# Patient Record
Sex: FEMALE | Race: WHITE | NOT HISPANIC OR LATINO | Employment: PART TIME | URBAN - METROPOLITAN AREA
[De-identification: names, ages, dates, MRNs, and addresses within clinical notes are randomized per-mention and may not be internally consistent; named-entity substitution may affect disease eponyms.]

---

## 2017-04-18 ENCOUNTER — OFFICE VISIT (OUTPATIENT)
Dept: FAMILY MEDICINE | Facility: CLINIC | Age: 57
End: 2017-04-18
Attending: FAMILY MEDICINE
Payer: COMMERCIAL

## 2017-04-18 VITALS
TEMPERATURE: 98 F | HEART RATE: 85 BPM | OXYGEN SATURATION: 96 % | HEIGHT: 65 IN | SYSTOLIC BLOOD PRESSURE: 120 MMHG | WEIGHT: 117.88 LBS | DIASTOLIC BLOOD PRESSURE: 74 MMHG | BODY MASS INDEX: 19.64 KG/M2

## 2017-04-18 DIAGNOSIS — R63.4 WEIGHT LOSS: ICD-10-CM

## 2017-04-18 DIAGNOSIS — J06.9 UPPER RESPIRATORY TRACT INFECTION, UNSPECIFIED TYPE: Primary | ICD-10-CM

## 2017-04-18 DIAGNOSIS — F17.200 SMOKER: ICD-10-CM

## 2017-04-18 PROCEDURE — 99999 PR PBB SHADOW E&M-EST. PATIENT-LVL III: CPT | Mod: PBBFAC,,, | Performed by: FAMILY MEDICINE

## 2017-04-18 PROCEDURE — 1160F RVW MEDS BY RX/DR IN RCRD: CPT | Mod: S$GLB,,, | Performed by: FAMILY MEDICINE

## 2017-04-18 PROCEDURE — 99213 OFFICE O/P EST LOW 20 MIN: CPT | Mod: S$GLB,,, | Performed by: FAMILY MEDICINE

## 2017-04-18 RX ORDER — GUAIFENESIN, PSEUDOEPHEDRINE HYDROCHLORIDE 600; 60 MG/1; MG/1
1 TABLET, EXTENDED RELEASE ORAL 2 TIMES DAILY
Qty: 30 TABLET | Refills: 1 | Status: SHIPPED | OUTPATIENT
Start: 2017-04-18 | End: 2017-04-28

## 2017-04-18 RX ORDER — CETIRIZINE HYDROCHLORIDE 10 MG/1
10 TABLET ORAL DAILY
Qty: 30 TABLET | Refills: 2 | Status: SHIPPED | OUTPATIENT
Start: 2017-04-18 | End: 2022-12-13

## 2017-04-18 NOTE — PROGRESS NOTES
"  Subjective:       Megan Mccall is a 56 y.o. female who presents for evaluation of symptoms of a URI. Symptoms include congestion, headache described as slight, no  fever, post nasal drip, sinus pressure, sneezing and tooth pain. Onset of symptoms was 4 days ago, and has been gradually improving since that time. Treatment to date: none.    The following portions of the patient's history were reviewed and updated as appropriate: allergies, current medications, past family history, past medical history, past social history, past surgical history and problem list.    Review of Systems  A comprehensive review of systems was negative except for: Constitutional: positive for weight loss (~ 15 lbs over past year)    Objective:      /74 (BP Location: Right arm, Patient Position: Sitting, BP Method: Manual)  Pulse 85  Temp 98.1 °F (36.7 °C) (Oral)   Ht 5' 4.5" (1.638 m)  Wt 53.5 kg (117 lb 14.4 oz)  SpO2 96%  BMI 19.92 kg/m2  General appearance: alert, appears stated age and cooperative  Head: Normocephalic, without obvious abnormality, atraumatic  Ears: normal TM's and external ear canals both ears  Nose: turbinates pink, swollen, edematous  Throat: abnormal findings: mild oropharyngeal erythema  Neck: no adenopathy, no carotid bruit, no JVD, supple, symmetrical, trachea midline and thyroid not enlarged, symmetric, no tenderness/mass/nodules  Lungs: clear to auscultation bilaterally  Heart: regular rate and rhythm, S1, S2 normal, no murmur, click, rub or gallop     Assessment:      viral upper respiratory illness    weight loss    Plan:      Discussed diagnosis and treatment of URI.  Discussed the importance of avoiding unnecessary antibiotic therapy.  Suggested symptomatic OTC remedies.  Suggested brief course of Afrin or similar.    CXR.  Follow-up by phone/email in a few days.  "

## 2017-04-18 NOTE — MR AVS SNAPSHOT
[facility]  [address] [place] [address], [address]  [facility] [address]  Phone: [phone]                  Megan Mccall   2017 2:00 PM   Office Visit    Description:  Female : 1960   Provider:  Edwar Rodarte Jr., MD   Department:  [facility]           Reason for Visit     Sore Throat           Diagnoses this Visit        Comments    Upper respiratory tract infection, unspecified type    -  Primary     Weight loss         Smoker                To Do List           Goals (5 Years of Data)     None       These Medications        Disp Refills Start End    pseudoephedrine-guaifenesin  mg (MUCINEX D)  mg per tablet 30 tablet 1 2017    Take 1 tablet by mouth 2 (two) times daily. - Oral    Pharmacy: [facility] - [place], [place] - [address]  #: [phone]       cetirizine (ZYRTEC) 10 MG tablet 30 tablet 2 2017     Take 1 tablet (10 mg total) by mouth once daily. - Oral    Pharmacy: [facility] - [place], [place] - [address]  #: [phone]         [facility]     [facility] Care Line -  Assistance  Unless otherwise directed by your provider, please contact [facility], our nurse care line that is available for  assistance.     Registered nurses in the [facility] provide: appointment scheduling, clinical advisement, health education, and other advisory services.  Call: [phone] (toll free)               Medications           Message regarding Medications     Verify the changes and/or additions to your medication regime listed below are the same as discussed with your clinician today.  If any of these changes or additions are incorrect, please notify your healthcare provider.        START taking these NEW medications        Refills    pseudoephedrine-guaifenesin  mg (MUCINEX D)  mg per tablet 1    Sig: Take 1 tablet by mouth 2 (two) times daily.    Class:  "Normal    Route: Oral    cetirizine (ZYRTEC) 10 MG tablet 2    Sig: Take 1 tablet (10 mg total) by mouth once daily.    Class: Normal    Route: Oral      STOP taking these medications     escitalopram oxalate (LEXAPRO) 20 MG tablet Take 20 mg by mouth once daily.    peg-electrolyte soln (TRILYTE WITH FLAVOR PACKETS) 420 gram SolR Take 4,000 mLs by mouth as directed.           Verify that the below list of medications is an accurate representation of the medications you are currently taking.  If none reported, the list may be blank. If incorrect, please contact your healthcare provider. Carry this list with you in case of emergency.           Current Medications     cetirizine (ZYRTEC) 10 MG tablet Take 1 tablet (10 mg total) by mouth once daily.    pseudoephedrine-guaifenesin  mg (MUCINEX D)  mg per tablet Take 1 tablet by mouth 2 (two) times daily.           Clinical Reference Information           Your Vitals Were     BP Pulse Temp Height    120/74 (BP Location: Right arm, Patient Position: Sitting, BP Method: Manual) 85 98.1 °F (36.7 °C) (Oral) 5' 4.5" (1.638 m)    Weight SpO2 BMI    53.5 kg (117 lb 14.4 oz) 96% 19.92 kg/m2      Blood Pressure          Most Recent Value    BP  120/74      Allergies as of 4/18/2017     No Known Allergies      Immunizations Administered on Date of Encounter - 4/18/2017     None      Orders Placed During Today's Visit     Future Labs/Procedures Expected by Expires    X-Ray Chest PA And Lateral  4/18/2017 4/18/2018      Instructions        Call if:  * A high, prolonged fever (above 102 degrees)  * Symptoms that last for more than 10 days or get worse instead of better  * Trouble breathing or shortness of breath  * Pain or pressure in the chest  * Fainting or feeling like you are about to faint  * Confusion or disorientation  * Severe or persistent vomiting  * Severe pain in your face or forehead  * Hoarseness, sore throat or a cough that won't go away after 10 " days      For sore throat:  Gargle with warm salt water several times a day to reduce throat swelling and pain. Mix 1 teaspoon of salt in 8 ounces of water.   Drink extra fluids to soothe your throat. Honey or lemon in weak tea may help.   Get plenty of rest.   Takeover-the-counter medicine such as aspirin or ibuprofen to relieve pain and reduce fever.   Try throat lozenges that have a painkiller to numb your throat, such as Sucrets Maximum Strength. Regular cough drops may also help.   Try a decongestant. It can make breathing easier and relieve a runny nose and postnasal drip, which can make your throat hurt.            Smoking Cessation     If you would like to quit smoking:   You may be eligible for free services if you are a [place] resident and started smoking cigarettes before September 1, 1988.  Call the Smoking Cessation [employer] ([facility]) toll free at (173) 729-4488 or ([phone] 492-3958.   Call 1-800-QUIT-NOW if you do not meet the above criteria.   Contact us via email: [email or web address]   View our website for more information: [email or web address]        Language Assistance Services     ATTENTION: Language assistance services are available, free of charge. Please call [phone].      ATENCIÓN: Si olamide cristobal, tiene a aly disposición servicios gratuitos de asistencia lingüística. Llame al [phone].     CHÚ Ý: N?u b?n nói Ti?ng Vi?t, có các d?ch v? h? tr? ngôn ng? mi?n phí dành cho b?n. G?i s? [phone].         [facility] complies with applicable Federal civil rights laws and does not discriminate on the basis of race, color, national origin, age, disability, or sex.

## 2018-05-18 DIAGNOSIS — Z11.59 NEED FOR HEPATITIS C SCREENING TEST: ICD-10-CM

## 2018-06-25 DIAGNOSIS — Z12.39 BREAST CANCER SCREENING: ICD-10-CM

## 2018-06-29 ENCOUNTER — PATIENT MESSAGE (OUTPATIENT)
Dept: ENDOSCOPY | Facility: HOSPITAL | Age: 58
End: 2018-06-29

## 2018-10-30 NOTE — PATIENT INSTRUCTIONS
Call if:  * A high, prolonged fever (above 102 degrees)  * Symptoms that last for more than 10 days or get worse instead of better  * Trouble breathing or shortness of breath  * Pain or pressure in the chest  * Fainting or feeling like you are about to faint  * Confusion or disorientation  * Severe or persistent vomiting  * Severe pain in your face or forehead  * Hoarseness, sore throat or a cough that won't go away after 10 days      For sore throat:  Gargle with warm salt water several times a day to reduce throat swelling and pain. Mix 1 teaspoon of salt in 8 ounces of water.   Drink extra fluids to soothe your throat. Honey or lemon in weak tea may help.   Get plenty of rest.   Takeover-the-counter medicine such as aspirin or ibuprofen to relieve pain and reduce fever.   Try throat lozenges that have a painkiller to numb your throat, such as Sucrets Maximum Strength. Regular cough drops may also help.   Try a decongestant. It can make breathing easier and relieve a runny nose and postnasal drip, which can make your throat hurt.       
Benefits, risks, and possible complications of procedure explained to patient/caregiver who verbalized understanding and gave verbal consent.

## 2022-10-20 ENCOUNTER — OFFICE VISIT (OUTPATIENT)
Dept: URGENT CARE | Facility: CLINIC | Age: 62
End: 2022-10-20
Payer: COMMERCIAL

## 2022-10-20 VITALS
OXYGEN SATURATION: 97 % | SYSTOLIC BLOOD PRESSURE: 127 MMHG | HEIGHT: 64 IN | BODY MASS INDEX: 18.27 KG/M2 | TEMPERATURE: 98 F | WEIGHT: 107 LBS | RESPIRATION RATE: 18 BRPM | HEART RATE: 92 BPM | DIASTOLIC BLOOD PRESSURE: 88 MMHG

## 2022-10-20 DIAGNOSIS — F17.200 NEEDS SMOKING CESSATION EDUCATION: ICD-10-CM

## 2022-10-20 DIAGNOSIS — N30.01 ACUTE CYSTITIS WITH HEMATURIA: Primary | ICD-10-CM

## 2022-10-20 LAB
BILIRUB UR QL STRIP: NEGATIVE
GLUCOSE UR QL STRIP: NEGATIVE
KETONES UR QL STRIP: NEGATIVE
LEUKOCYTE ESTERASE UR QL STRIP: POSITIVE
PH, POC UA: 6.5 (ref 5–8)
POC BLOOD, URINE: POSITIVE
POC NITRATES, URINE: NEGATIVE
PROT UR QL STRIP: POSITIVE
SP GR UR STRIP: 1.01 (ref 1–1.03)
UROBILINOGEN UR STRIP-ACNC: NORMAL (ref 0.1–1.1)

## 2022-10-20 PROCEDURE — 1160F PR REVIEW ALL MEDS BY PRESCRIBER/CLIN PHARMACIST DOCUMENTED: ICD-10-PCS | Mod: CPTII,S$GLB,, | Performed by: PHYSICIAN ASSISTANT

## 2022-10-20 PROCEDURE — 99203 PR OFFICE/OUTPT VISIT, NEW, LEVL III, 30-44 MIN: ICD-10-PCS | Mod: S$GLB,,, | Performed by: PHYSICIAN ASSISTANT

## 2022-10-20 PROCEDURE — 1159F PR MEDICATION LIST DOCUMENTED IN MEDICAL RECORD: ICD-10-PCS | Mod: CPTII,S$GLB,, | Performed by: PHYSICIAN ASSISTANT

## 2022-10-20 PROCEDURE — 3074F SYST BP LT 130 MM HG: CPT | Mod: CPTII,S$GLB,, | Performed by: PHYSICIAN ASSISTANT

## 2022-10-20 PROCEDURE — 81003 POCT URINALYSIS, DIPSTICK, AUTOMATED, W/O SCOPE: ICD-10-PCS | Mod: QW,S$GLB,, | Performed by: PHYSICIAN ASSISTANT

## 2022-10-20 PROCEDURE — 81003 URINALYSIS AUTO W/O SCOPE: CPT | Mod: QW,S$GLB,, | Performed by: PHYSICIAN ASSISTANT

## 2022-10-20 PROCEDURE — 1160F RVW MEDS BY RX/DR IN RCRD: CPT | Mod: CPTII,S$GLB,, | Performed by: PHYSICIAN ASSISTANT

## 2022-10-20 PROCEDURE — 1159F MED LIST DOCD IN RCRD: CPT | Mod: CPTII,S$GLB,, | Performed by: PHYSICIAN ASSISTANT

## 2022-10-20 PROCEDURE — 3074F PR MOST RECENT SYSTOLIC BLOOD PRESSURE < 130 MM HG: ICD-10-PCS | Mod: CPTII,S$GLB,, | Performed by: PHYSICIAN ASSISTANT

## 2022-10-20 PROCEDURE — 3079F DIAST BP 80-89 MM HG: CPT | Mod: CPTII,S$GLB,, | Performed by: PHYSICIAN ASSISTANT

## 2022-10-20 PROCEDURE — 99203 OFFICE O/P NEW LOW 30 MIN: CPT | Mod: S$GLB,,, | Performed by: PHYSICIAN ASSISTANT

## 2022-10-20 PROCEDURE — 3079F PR MOST RECENT DIASTOLIC BLOOD PRESSURE 80-89 MM HG: ICD-10-PCS | Mod: CPTII,S$GLB,, | Performed by: PHYSICIAN ASSISTANT

## 2022-10-20 RX ORDER — SULFAMETHOXAZOLE AND TRIMETHOPRIM 800; 160 MG/1; MG/1
1 TABLET ORAL 2 TIMES DAILY
Qty: 10 TABLET | Refills: 0 | Status: SHIPPED | OUTPATIENT
Start: 2022-10-20 | End: 2022-10-25

## 2022-10-20 RX ORDER — BUSPIRONE HYDROCHLORIDE 15 MG/1
15 TABLET ORAL 3 TIMES DAILY
COMMUNITY

## 2022-10-20 RX ORDER — BUPROPION HYDROCHLORIDE 150 MG/1
150 TABLET, EXTENDED RELEASE ORAL 2 TIMES DAILY
COMMUNITY
End: 2022-12-13

## 2022-10-20 NOTE — PATIENT INSTRUCTIONS
- Rest.    - Drink plenty of fluids.    - Acetaminophen (tylenol) or Ibuprofen (advil,motrin) as directed as needed for fever/pain. Avoid tylenol if you have a history of liver disease. Do not take ibuprofen if you have a history of GI bleeding, kidney disease, or if you take blood thinners.     - You can take over the counter AZO as directed for discomfort with urination. This may cause your urine to turn orange/red color.    - You have been given an antibiotic (bactrim)  to treat your condition today.    - Please complete the antibiotic as directed on the bottle.   - If you are female and on oral birth control pills, use additional methods to prevent pregnancy while on antibiotics and for one cycle after.   - you can take otc probiotic to limit upset stomach    - Follow up with your PCP or specialty clinic as directed in the next 1-2 weeks if not improved or as needed.  You can call [phone] to schedule an appointment with the appropriate provider.    - Go to the ER or seek medical attention immediately if you develop new or worsening symptoms.     - You must understand that you have received an Urgent Care treatment only and that you may be released before all of your medical problems are known or treated.   - You, the patient, will arrange for follow up care as instructed.   - If your condition worsens or fails to improve we recommend that you receive another evaluation at the ER immediately or contact your PCP to discuss your concerns or return here.

## 2022-10-20 NOTE — PROGRESS NOTES
"Subjective:       Patient ID: Megan Mccall is a 62 y.o. female.    Vitals:  height is 5' 4" (1.626 m) and weight is 48.5 kg (107 lb). Her oral temperature is 97.9 °F (36.6 °C). Her blood pressure is 127/88 and her pulse is 92. Her respiration is 18 and oxygen saturation is 97%.     Chief Complaint: Dysuria    Pt presents with dysuria, frequency, and urgency x 6 days.  Had one episode of hematuria. Has taken azo with temporary relief, though symptoms are persistent.  Patient states that this feels like prior UTIs.  No flank pain or vomiting.  No fever.    Dysuria   This is a new problem. The current episode started in the past 7 days. The problem has been gradually worsening. The quality of the pain is described as burning. The pain is at a severity of 7/10. There has been no fever. Associated symptoms include frequency and urgency. Pertinent negatives include no behavior changes, chills, discharge, flank pain, hematuria, hesitancy, nausea, possible pregnancy, sweats, vomiting, weight loss, bubble bath use, constipation, rash or withholding. Treatments tried: AZO. The treatment provided mild relief.     Constitution: Negative for chills, sweating, fatigue and fever.   HENT:  Negative for ear pain, congestion, postnasal drip and trouble swallowing.    Neck: Negative for neck pain and neck stiffness.   Cardiovascular:  Negative for chest pain, leg swelling, palpitations and sob on exertion.   Eyes:  Negative for eye itching, eye pain and eye redness.   Respiratory:  Negative for cough, sputum production and shortness of breath.    Gastrointestinal:  Negative for abdominal pain, nausea, vomiting and constipation.   Genitourinary:  Positive for dysuria, frequency and urgency. Negative for flank pain and hematuria.   Musculoskeletal:  Negative for trauma and joint swelling.   Skin:  Negative for color change and rash.   Neurological:  Negative for dizziness, headaches, numbness and tingling.     Objective:      Physical " Exam   Constitutional: She is oriented to person, place, and time. She appears well-developed.  Non-toxic appearance. She does not appear ill. No distress.   HENT:   Head: Normocephalic and atraumatic.   Ears:   Right Ear: External ear normal.   Left Ear: External ear normal.   Eyes: Conjunctivae are normal. Right eye exhibits no discharge. Left eye exhibits no discharge. No scleral icterus.   Pulmonary/Chest: Effort normal. No stridor. No respiratory distress. She has no wheezes.   Abdominal: Soft. There is no abdominal tenderness. There is no guarding, no left CVA tenderness and no right CVA tenderness.   Neurological: She is alert and oriented to person, place, and time.   Skin: Skin is not diaphoretic.   Psychiatric: Her behavior is normal. Judgment and thought content normal.   Nursing note and vitals reviewed.        Results for orders placed or performed in visit on 10/20/22   POCT Urinalysis, Dipstick, Automated, W/O Scope   Result Value Ref Range    POC Blood, Urine Positive (A) Negative    POC Bilirubin, Urine Negative Negative    POC Urobilinogen, Urine normal 0.1 - 1.1    POC Ketones, Urine Negative Negative    POC Protein, Urine Positive (A) Negative    POC Nitrates, Urine Negative Negative    POC Glucose, Urine Negative Negative    pH, UA 6.5 5 - 8    POC Specific Gravity, Urine 1.015 1.003 - 1.029    POC Leukocytes, Urine Positive (A) Negative       Assessment:       1. Acute cystitis with hematuria          Plan:         Acute cystitis with hematuria  -     POCT Urinalysis, Dipstick, Automated, W/O Scope  -     sulfamethoxazole-trimethoprim 800-160mg (BACTRIM DS) 800-160 mg Tab; Take 1 tablet by mouth 2 (two) times daily. for 5 days  Dispense: 10 tablet; Refill: 0    - Discussed ddx, home care, tx options, and given follow up precautions.        Patient Instructions   - Rest.    - Drink plenty of fluids.    - Acetaminophen (tylenol) or Ibuprofen (advil,motrin) as directed as needed for fever/pain.  Avoid tylenol if you have a history of liver disease. Do not take ibuprofen if you have a history of GI bleeding, kidney disease, or if you take blood thinners.     - You can take over the counter AZO as directed for discomfort with urination. This may cause your urine to turn orange/red color.    - You have been given an antibiotic (bactrim)  to treat your condition today.    - Please complete the antibiotic as directed on the bottle.   - If you are female and on oral birth control pills, use additional methods to prevent pregnancy while on antibiotics and for one cycle after.   - you can take otc probiotic to limit upset stomach    - Follow up with your PCP or specialty clinic as directed in the next 1-2 weeks if not improved or as needed.  You can call [phone] to schedule an appointment with the appropriate provider.    - Go to the ER or seek medical attention immediately if you develop new or worsening symptoms.     - You must understand that you have received an Urgent Care treatment only and that you may be released before all of your medical problems are known or treated.   - You, the patient, will arrange for follow up care as instructed.   - If your condition worsens or fails to improve we recommend that you receive another evaluation at the ER immediately or contact your PCP to discuss your concerns or return here.

## 2022-12-04 ENCOUNTER — HOSPITAL ENCOUNTER (INPATIENT)
Facility: HOSPITAL | Age: 62
LOS: 1 days | Discharge: HOME | DRG: 882 | End: 2022-12-05
Attending: EMERGENCY MEDICINE | Admitting: HOSPITALIST
Payer: COMMERCIAL

## 2022-12-04 DIAGNOSIS — R79.89 TROPONIN LEVEL ELEVATED: ICD-10-CM

## 2022-12-04 DIAGNOSIS — Z72.0 TOBACCO USE: ICD-10-CM

## 2022-12-04 DIAGNOSIS — I21.4 NSTEMI (NON-ST ELEVATED MYOCARDIAL INFARCTION): Primary | ICD-10-CM

## 2022-12-04 DIAGNOSIS — R07.9 CHEST PAIN: ICD-10-CM

## 2022-12-04 DIAGNOSIS — F32.A ANXIETY AND DEPRESSION: ICD-10-CM

## 2022-12-04 DIAGNOSIS — F41.9 ANXIETY AND DEPRESSION: ICD-10-CM

## 2022-12-04 DIAGNOSIS — I51.81 TAKOTSUBO CARDIOMYOPATHY: ICD-10-CM

## 2022-12-04 LAB
ALBUMIN SERPL BCP-MCNC: 4.5 G/DL (ref 3.5–5.2)
ALP SERPL-CCNC: 96 U/L (ref 55–135)
ALT SERPL W/O P-5'-P-CCNC: 22 U/L (ref 10–44)
AMPHET+METHAMPHET UR QL: NEGATIVE
ANION GAP SERPL CALC-SCNC: 10 MMOL/L (ref 8–16)
APTT BLDCRRT: 24.5 SEC (ref 21–32)
APTT BLDCRRT: 33.5 SEC (ref 21–32)
AST SERPL-CCNC: 24 U/L (ref 10–40)
BARBITURATES UR QL SCN>200 NG/ML: NEGATIVE
BASOPHILS # BLD AUTO: 0.04 K/UL (ref 0–0.2)
BASOPHILS # BLD AUTO: 0.04 K/UL (ref 0–0.2)
BASOPHILS NFR BLD: 0.4 % (ref 0–1.9)
BASOPHILS NFR BLD: 0.7 % (ref 0–1.9)
BENZODIAZ UR QL SCN>200 NG/ML: NEGATIVE
BILIRUB SERPL-MCNC: 0.3 MG/DL (ref 0.1–1)
BNP SERPL-MCNC: 29 PG/ML (ref 0–99)
BUN SERPL-MCNC: 12 MG/DL (ref 8–23)
BZE UR QL SCN: NEGATIVE
CALCIUM SERPL-MCNC: 9.8 MG/DL (ref 8.7–10.5)
CANNABINOIDS UR QL SCN: NEGATIVE
CHLORIDE SERPL-SCNC: 102 MMOL/L (ref 95–110)
CHOLEST SERPL-MCNC: 180 MG/DL (ref 120–199)
CHOLEST/HDLC SERPL: 2.5 {RATIO} (ref 2–5)
CO2 SERPL-SCNC: 27 MMOL/L (ref 23–29)
CREAT SERPL-MCNC: 0.8 MG/DL (ref 0.5–1.4)
CREAT UR-MCNC: 90 MG/DL (ref 15–325)
DIFFERENTIAL METHOD: ABNORMAL
DIFFERENTIAL METHOD: ABNORMAL
EOSINOPHIL # BLD AUTO: 0.2 K/UL (ref 0–0.5)
EOSINOPHIL # BLD AUTO: 0.3 K/UL (ref 0–0.5)
EOSINOPHIL NFR BLD: 2.8 % (ref 0–8)
EOSINOPHIL NFR BLD: 2.8 % (ref 0–8)
ERYTHROCYTE [DISTWIDTH] IN BLOOD BY AUTOMATED COUNT: 13.4 % (ref 11.5–14.5)
ERYTHROCYTE [DISTWIDTH] IN BLOOD BY AUTOMATED COUNT: 13.5 % (ref 11.5–14.5)
EST. GFR  (NO RACE VARIABLE): >60 ML/MIN/1.73 M^2
ESTIMATED AVG GLUCOSE: 103 MG/DL (ref 68–131)
ETHANOL SERPL-MCNC: <10 MG/DL
ETHANOL UR-MCNC: <10 MG/DL
GLUCOSE SERPL-MCNC: 104 MG/DL (ref 70–110)
HBA1C MFR BLD: 5.2 % (ref 4–5.6)
HCT VFR BLD AUTO: 42.1 % (ref 37–48.5)
HCT VFR BLD AUTO: 44.2 % (ref 37–48.5)
HCV AB SERPL QL IA: NORMAL
HDLC SERPL-MCNC: 73 MG/DL (ref 40–75)
HDLC SERPL: 40.6 % (ref 20–50)
HGB BLD-MCNC: 13.3 G/DL (ref 12–16)
HGB BLD-MCNC: 14.1 G/DL (ref 12–16)
HIV 1+2 AB+HIV1 P24 AG SERPL QL IA: NORMAL
IMM GRANULOCYTES # BLD AUTO: 0.01 K/UL (ref 0–0.04)
IMM GRANULOCYTES # BLD AUTO: 0.03 K/UL (ref 0–0.04)
IMM GRANULOCYTES NFR BLD AUTO: 0.2 % (ref 0–0.5)
IMM GRANULOCYTES NFR BLD AUTO: 0.3 % (ref 0–0.5)
INR PPP: 1 (ref 0.8–1.2)
LDLC SERPL CALC-MCNC: 94.6 MG/DL (ref 63–159)
LYMPHOCYTES # BLD AUTO: 1.3 K/UL (ref 1–4.8)
LYMPHOCYTES # BLD AUTO: 1.4 K/UL (ref 1–4.8)
LYMPHOCYTES NFR BLD: 15.4 % (ref 18–48)
LYMPHOCYTES NFR BLD: 22.2 % (ref 18–48)
MCH RBC QN AUTO: 31.4 PG (ref 27–31)
MCH RBC QN AUTO: 32 PG (ref 27–31)
MCHC RBC AUTO-ENTMCNC: 31.6 G/DL (ref 32–36)
MCHC RBC AUTO-ENTMCNC: 31.9 G/DL (ref 32–36)
MCV RBC AUTO: 101 FL (ref 82–98)
MCV RBC AUTO: 99 FL (ref 82–98)
METHADONE UR QL SCN>300 NG/ML: NEGATIVE
MONOCYTES # BLD AUTO: 0.4 K/UL (ref 0.3–1)
MONOCYTES # BLD AUTO: 0.4 K/UL (ref 0.3–1)
MONOCYTES NFR BLD: 3.9 % (ref 4–15)
MONOCYTES NFR BLD: 7.7 % (ref 4–15)
NEUTROPHILS # BLD AUTO: 3.8 K/UL (ref 1.8–7.7)
NEUTROPHILS # BLD AUTO: 7.1 K/UL (ref 1.8–7.7)
NEUTROPHILS NFR BLD: 66.4 % (ref 38–73)
NEUTROPHILS NFR BLD: 77.2 % (ref 38–73)
NONHDLC SERPL-MCNC: 107 MG/DL
NRBC BLD-RTO: 0 /100 WBC
NRBC BLD-RTO: 0 /100 WBC
OPIATES UR QL SCN: NEGATIVE
PCP UR QL SCN>25 NG/ML: NEGATIVE
PLATELET # BLD AUTO: 233 K/UL (ref 150–450)
PLATELET # BLD AUTO: 242 K/UL (ref 150–450)
PMV BLD AUTO: 9.3 FL (ref 9.2–12.9)
PMV BLD AUTO: 9.5 FL (ref 9.2–12.9)
POTASSIUM SERPL-SCNC: 4.1 MMOL/L (ref 3.5–5.1)
PROT SERPL-MCNC: 7.7 G/DL (ref 6–8.4)
PROTHROMBIN TIME: 10.5 SEC (ref 9–12.5)
RBC # BLD AUTO: 4.24 M/UL (ref 4–5.4)
RBC # BLD AUTO: 4.4 M/UL (ref 4–5.4)
SODIUM SERPL-SCNC: 139 MMOL/L (ref 136–145)
TOXICOLOGY INFORMATION: NORMAL
TRIGL SERPL-MCNC: 62 MG/DL (ref 30–150)
TROPONIN I SERPL DL<=0.01 NG/ML-MCNC: 1.86 NG/ML (ref 0–0.03)
TROPONIN I SERPL DL<=0.01 NG/ML-MCNC: 2.41 NG/ML (ref 0–0.03)
TROPONIN I SERPL DL<=0.01 NG/ML-MCNC: 3.8 NG/ML (ref 0–0.03)
TSH SERPL DL<=0.005 MIU/L-ACNC: 1.3 UIU/ML (ref 0.4–4)
WBC # BLD AUTO: 5.73 K/UL (ref 3.9–12.7)
WBC # BLD AUTO: 9.22 K/UL (ref 3.9–12.7)

## 2022-12-04 PROCEDURE — 99223 1ST HOSP IP/OBS HIGH 75: CPT | Mod: ,,, | Performed by: INTERNAL MEDICINE

## 2022-12-04 PROCEDURE — 82077 ASSAY SPEC XCP UR&BREATH IA: CPT

## 2022-12-04 PROCEDURE — 99285 EMERGENCY DEPT VISIT HI MDM: CPT | Mod: 25

## 2022-12-04 PROCEDURE — 20600001 HC STEP DOWN PRIVATE ROOM

## 2022-12-04 PROCEDURE — 83036 HEMOGLOBIN GLYCOSYLATED A1C: CPT

## 2022-12-04 PROCEDURE — 85025 COMPLETE CBC W/AUTO DIFF WBC: CPT | Mod: 91 | Performed by: STUDENT IN AN ORGANIZED HEALTH CARE EDUCATION/TRAINING PROGRAM

## 2022-12-04 PROCEDURE — 84484 ASSAY OF TROPONIN QUANT: CPT | Performed by: EMERGENCY MEDICINE

## 2022-12-04 PROCEDURE — 85610 PROTHROMBIN TIME: CPT | Performed by: STUDENT IN AN ORGANIZED HEALTH CARE EDUCATION/TRAINING PROGRAM

## 2022-12-04 PROCEDURE — 80053 COMPREHEN METABOLIC PANEL: CPT | Performed by: EMERGENCY MEDICINE

## 2022-12-04 PROCEDURE — 36415 COLL VENOUS BLD VENIPUNCTURE: CPT | Performed by: HOSPITALIST

## 2022-12-04 PROCEDURE — 93005 ELECTROCARDIOGRAM TRACING: CPT

## 2022-12-04 PROCEDURE — 36415 COLL VENOUS BLD VENIPUNCTURE: CPT

## 2022-12-04 PROCEDURE — 93010 ELECTROCARDIOGRAM REPORT: CPT | Mod: 76,,, | Performed by: INTERNAL MEDICINE

## 2022-12-04 PROCEDURE — 36415 COLL VENOUS BLD VENIPUNCTURE: CPT | Performed by: STUDENT IN AN ORGANIZED HEALTH CARE EDUCATION/TRAINING PROGRAM

## 2022-12-04 PROCEDURE — 86803 HEPATITIS C AB TEST: CPT | Performed by: EMERGENCY MEDICINE

## 2022-12-04 PROCEDURE — 63600175 PHARM REV CODE 636 W HCPCS: Performed by: STUDENT IN AN ORGANIZED HEALTH CARE EDUCATION/TRAINING PROGRAM

## 2022-12-04 PROCEDURE — 25000003 PHARM REV CODE 250: Performed by: EMERGENCY MEDICINE

## 2022-12-04 PROCEDURE — 99291 PR CRITICAL CARE, E/M 30-74 MINUTES: ICD-10-PCS | Mod: ,,, | Performed by: EMERGENCY MEDICINE

## 2022-12-04 PROCEDURE — 84443 ASSAY THYROID STIM HORMONE: CPT

## 2022-12-04 PROCEDURE — 93010 EKG 12-LEAD: ICD-10-PCS | Mod: 76,,, | Performed by: INTERNAL MEDICINE

## 2022-12-04 PROCEDURE — 83880 ASSAY OF NATRIURETIC PEPTIDE: CPT | Performed by: EMERGENCY MEDICINE

## 2022-12-04 PROCEDURE — 25000003 PHARM REV CODE 250

## 2022-12-04 PROCEDURE — 85730 THROMBOPLASTIN TIME PARTIAL: CPT | Mod: 91 | Performed by: HOSPITALIST

## 2022-12-04 PROCEDURE — 85730 THROMBOPLASTIN TIME PARTIAL: CPT | Performed by: STUDENT IN AN ORGANIZED HEALTH CARE EDUCATION/TRAINING PROGRAM

## 2022-12-04 PROCEDURE — 99233 SBSQ HOSP IP/OBS HIGH 50: CPT | Mod: ,,, | Performed by: INTERNAL MEDICINE

## 2022-12-04 PROCEDURE — 80307 DRUG TEST PRSMV CHEM ANLYZR: CPT

## 2022-12-04 PROCEDURE — 99233 PR SUBSEQUENT HOSPITAL CARE,LEVL III: ICD-10-PCS | Mod: ,,, | Performed by: INTERNAL MEDICINE

## 2022-12-04 PROCEDURE — 80061 LIPID PANEL: CPT

## 2022-12-04 PROCEDURE — 84484 ASSAY OF TROPONIN QUANT: CPT | Mod: 91

## 2022-12-04 PROCEDURE — 87389 HIV-1 AG W/HIV-1&-2 AB AG IA: CPT | Performed by: EMERGENCY MEDICINE

## 2022-12-04 PROCEDURE — 25000003 PHARM REV CODE 250: Performed by: STUDENT IN AN ORGANIZED HEALTH CARE EDUCATION/TRAINING PROGRAM

## 2022-12-04 PROCEDURE — 93010 EKG 12-LEAD: ICD-10-PCS | Mod: ,,, | Performed by: INTERNAL MEDICINE

## 2022-12-04 PROCEDURE — 85025 COMPLETE CBC W/AUTO DIFF WBC: CPT | Performed by: EMERGENCY MEDICINE

## 2022-12-04 PROCEDURE — 93010 ELECTROCARDIOGRAM REPORT: CPT | Mod: ,,, | Performed by: INTERNAL MEDICINE

## 2022-12-04 PROCEDURE — 99291 CRITICAL CARE FIRST HOUR: CPT | Mod: ,,, | Performed by: EMERGENCY MEDICINE

## 2022-12-04 PROCEDURE — 99223 PR INITIAL HOSPITAL CARE,LEVL III: ICD-10-PCS | Mod: ,,, | Performed by: INTERNAL MEDICINE

## 2022-12-04 PROCEDURE — 25000242 PHARM REV CODE 250 ALT 637 W/ HCPCS: Performed by: EMERGENCY MEDICINE

## 2022-12-04 RX ORDER — TALC
6 POWDER (GRAM) TOPICAL NIGHTLY PRN
Status: DISCONTINUED | OUTPATIENT
Start: 2022-12-04 | End: 2022-12-05 | Stop reason: HOSPADM

## 2022-12-04 RX ORDER — METOPROLOL TARTRATE 25 MG/1
25 TABLET, FILM COATED ORAL 2 TIMES DAILY
Status: DISCONTINUED | OUTPATIENT
Start: 2022-12-04 | End: 2022-12-04

## 2022-12-04 RX ORDER — BUPROPION HYDROCHLORIDE 150 MG/1
150 TABLET ORAL DAILY
Status: DISCONTINUED | OUTPATIENT
Start: 2022-12-04 | End: 2022-12-05 | Stop reason: HOSPADM

## 2022-12-04 RX ORDER — BUSPIRONE HYDROCHLORIDE 5 MG/1
15 TABLET ORAL 3 TIMES DAILY
Status: DISCONTINUED | OUTPATIENT
Start: 2022-12-04 | End: 2022-12-04

## 2022-12-04 RX ORDER — SODIUM CHLORIDE 0.9 % (FLUSH) 0.9 %
10 SYRINGE (ML) INJECTION
Status: DISCONTINUED | OUTPATIENT
Start: 2022-12-04 | End: 2022-12-05 | Stop reason: HOSPADM

## 2022-12-04 RX ORDER — METOPROLOL TARTRATE 25 MG/1
25 TABLET, FILM COATED ORAL EVERY 8 HOURS
Status: DISCONTINUED | OUTPATIENT
Start: 2022-12-04 | End: 2022-12-05 | Stop reason: HOSPADM

## 2022-12-04 RX ORDER — HEPARIN SODIUM,PORCINE/D5W 25000/250
0-40 INTRAVENOUS SOLUTION INTRAVENOUS CONTINUOUS
Status: DISCONTINUED | OUTPATIENT
Start: 2022-12-04 | End: 2022-12-05

## 2022-12-04 RX ORDER — NITROGLYCERIN 0.4 MG/1
0.4 TABLET SUBLINGUAL
Status: COMPLETED | OUTPATIENT
Start: 2022-12-04 | End: 2022-12-04

## 2022-12-04 RX ORDER — BUSPIRONE HYDROCHLORIDE 5 MG/1
15 TABLET ORAL 2 TIMES DAILY
Status: DISCONTINUED | OUTPATIENT
Start: 2022-12-04 | End: 2022-12-05 | Stop reason: HOSPADM

## 2022-12-04 RX ORDER — BUSPIRONE HYDROCHLORIDE 5 MG/1
15 TABLET ORAL 2 TIMES DAILY
Status: DISCONTINUED | OUTPATIENT
Start: 2022-12-05 | End: 2022-12-04

## 2022-12-04 RX ORDER — ATORVASTATIN CALCIUM 20 MG/1
80 TABLET, FILM COATED ORAL DAILY
Status: DISCONTINUED | OUTPATIENT
Start: 2022-12-04 | End: 2022-12-05 | Stop reason: HOSPADM

## 2022-12-04 RX ORDER — NAPROXEN SODIUM 220 MG/1
81 TABLET, FILM COATED ORAL DAILY
Status: DISCONTINUED | OUTPATIENT
Start: 2022-12-05 | End: 2022-12-05

## 2022-12-04 RX ORDER — ASPIRIN 325 MG
325 TABLET ORAL
Status: COMPLETED | OUTPATIENT
Start: 2022-12-04 | End: 2022-12-04

## 2022-12-04 RX ADMIN — TICAGRELOR 180 MG: 90 TABLET ORAL at 05:12

## 2022-12-04 RX ADMIN — BUPROPION HYDROCHLORIDE 150 MG: 150 TABLET, FILM COATED, EXTENDED RELEASE ORAL at 09:12

## 2022-12-04 RX ADMIN — HEPARIN SODIUM 12 UNITS/KG/HR: 10000 INJECTION, SOLUTION INTRAVENOUS at 09:12

## 2022-12-04 RX ADMIN — HEPARIN SODIUM 14 UNITS/KG/HR: 10000 INJECTION, SOLUTION INTRAVENOUS at 04:12

## 2022-12-04 RX ADMIN — LOSARTAN POTASSIUM 12.5 MG: 25 TABLET, FILM COATED ORAL at 12:12

## 2022-12-04 RX ADMIN — BUSPIRONE HYDROCHLORIDE 15 MG: 5 TABLET ORAL at 08:12

## 2022-12-04 RX ADMIN — METOPROLOL TARTRATE 25 MG: 25 TABLET, FILM COATED ORAL at 10:12

## 2022-12-04 RX ADMIN — ASPIRIN 325 MG: 325 TABLET ORAL at 04:12

## 2022-12-04 RX ADMIN — ATORVASTATIN CALCIUM 80 MG: 20 TABLET, FILM COATED ORAL at 09:12

## 2022-12-04 RX ADMIN — BUSPIRONE HYDROCHLORIDE 15 MG: 5 TABLET ORAL at 09:12

## 2022-12-04 RX ADMIN — NITROGLYCERIN 0.4 MG: 0.4 TABLET, ORALLY DISINTEGRATING SUBLINGUAL at 04:12

## 2022-12-04 RX ADMIN — METOPROLOL TARTRATE 25 MG: 25 TABLET, FILM COATED ORAL at 09:12

## 2022-12-04 RX ADMIN — Medication 6 MG: at 08:12

## 2022-12-04 NOTE — HPI
"Ms. Megan Mccall is a 63 yo F with pmhx s/f Depression, anxiety, and tobacco abuse. She presents today with symptoms of chest discomfort x 2 days. Symptoms started Thursday and described as a feeling of uncomfortableness and left sided chest ache. Pt reporting symptoms were persistent but alleviated when she was "distracted." She denies any exertional, pleuritic, or reproducible components. Symtoms appreciated Th and Fri. On Saturday, she was busy at work and felt fine. She came home and slept and woke up with moderate-severe squeezing chest discomfort associated with a heaviness in her left arm lasting about an hour. No other associated symptoms. Denied diaphoresis, nausea/vomiting, palpitations, presyncope, light headedness. Of note, she rolls own cig. And smokes about 15 cig/day x 30 years. Her father diet of an MI at the age of 59.    In the ED, was initially found to be significantly hypertensive with SBP of 200. On my evaluation, I rechecked it and found it to be 153/71. She said symptoms had dramatically improved since she woke up. ECG showed non specific findings including early repolarization and pr depression. Labs notable for a troponin of 2.8. Hg, creatinine wnl. CXR unremarkable.   "

## 2022-12-04 NOTE — HPI
Ms Mccall is a 63 yo F with PMH depression, anxiety, tobacco use who presents with 2 days of chest pain. She reports that it has felt like a dull ache with some pressure and has been intermittent. 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 She has had no nausea, vomiting, or dizziness. She reports an associated mild headache.     Patient reports that she rolls her own cigarettes and smokes the equivalent of 15 cigarettes per day. She also drinks 2-3 beers per day. 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    In the ED, HR 93, /99, O2 97 on room air. Labs notable for no leukocytosis or anemia, troponin 2.4, 1.8 on repeat. EKG without ST changes. Given ASA and brilinta load, SL nitro. Cardiology consulted and recommend initiating ACS protocol. Admitted for further management.

## 2022-12-04 NOTE — SUBJECTIVE & OBJECTIVE
Past Medical History:   Diagnosis Date    Anxiety     Depression        Past Surgical History:   Procedure Laterality Date    NONE         Review of patient's allergies indicates:  No Known Allergies    No current facility-administered medications on file prior to encounter.     Current Outpatient Medications on File Prior to Encounter   Medication Sig    buPROPion (WELLBUTRIN SR) 150 MG TBSR 12 hr tablet Take 150 mg by mouth 2 (two) times daily.    busPIRone (BUSPAR) 15 MG tablet Take 15 mg by mouth 3 (three) times daily.    cetirizine (ZYRTEC) 10 MG tablet Take 1 tablet (10 mg total) by mouth once daily. (Patient not taking: Reported on 10/20/2022)     Family History       Problem Relation (Age of Onset)    Diabetes Paternal Grandmother    Heart attack Father (59)    Heart disease Father          Tobacco Use    Smoking status: Every Day     Packs/day: 0.67     Years: 23.00     Pack years: 15.41     Types: Cigarettes     Start date: 8/25/1992    Smokeless tobacco: Never   Substance and Sexual Activity    Alcohol use: Yes     Alcohol/week: 18.0 standard drinks     Types: 18 Cans of beer per week     Comment: daily    Drug use: No    Sexual activity: Not on file     Review of Systems   Constitutional:  Negative for chills and fever.   HENT:  Negative for rhinorrhea and sore throat.    Eyes:  Negative for visual disturbance.   Respiratory:  Negative for cough and shortness of breath.    Cardiovascular:  Positive for chest pain. Negative for leg swelling.   Gastrointestinal:  Negative for diarrhea, nausea and vomiting.   Genitourinary:  Negative for dysuria.   Musculoskeletal:  Negative for arthralgias and myalgias.   Skin:  Negative for rash.   Neurological:  Positive for headaches. Negative for light-headedness.   Psychiatric/Behavioral:  Negative for agitation and confusion.    Objective:     Vital Signs (Most Recent):  Temp: 97.5 °F (36.4 °C) (12/04/22 0606)  Pulse: 86 (12/04/22 0606)  Resp: 20 (12/04/22 0606)  BP:  (!) 188/85 (12/04/22 0606)  SpO2: 97 % (12/04/22 0606)   Vital Signs (24h Range):  Temp:  [97.5 °F (36.4 °C)-97.8 °F (36.6 °C)] 97.5 °F (36.4 °C)  Pulse:  [86-97] 86  Resp:  [11-20] 20  SpO2:  [97 %-100 %] 97 %  BP: (134-205)/(67-99) 188/85        There is no height or weight on file to calculate BMI.    Physical Exam  HENT:      Head: Normocephalic and atraumatic.      Right Ear: External ear normal.      Left Ear: External ear normal.      Nose: Nose normal.      Mouth/Throat:      Mouth: Mucous membranes are moist.      Pharynx: Oropharynx is clear.   Eyes:      General: No scleral icterus.     Extraocular Movements: Extraocular movements intact.      Conjunctiva/sclera: Conjunctivae normal.   Cardiovascular:      Rate and Rhythm: Normal rate and regular rhythm.   Pulmonary:      Effort: Pulmonary effort is normal.      Breath sounds: Normal breath sounds. No wheezing or rales.   Abdominal:      General: Abdomen is flat. Bowel sounds are normal. There is no distension.      Palpations: Abdomen is soft.      Tenderness: There is no abdominal tenderness.   Musculoskeletal:         General: Normal range of motion.      Cervical back: Normal range of motion.   Skin:     General: Skin is warm and dry.   Neurological:      General: No focal deficit present.      Mental Status: She is alert.   Psychiatric:         Mood and Affect: Mood normal.         Behavior: Behavior normal.           Significant Labs: All pertinent labs within the past 24 hours have been reviewed.  Troponin:   Recent Labs   Lab 12/04/22  0241 12/04/22  0530   TROPONINI 2.408* 1.860*       Significant Imaging: I have reviewed all pertinent imaging results/findings within the past 24 hours.

## 2022-12-04 NOTE — CARE UPDATE
Most recent trops 3.799. Met patient at bedside, denies chest pain, SOB, or any new or developing symptoms. Patient is on ACS protocol, with plans for IC eval and potential cath. Cards and primary are aware. Will repeat and trend trops.

## 2022-12-04 NOTE — CARE UPDATE
"Cardiology care update:    Ms. Megan Mccall is a 63 yo F with pmhx s/f Depression, anxiety, and tobacco abuse. She presents today with symptoms of chest discomfort x 2 days. Symptoms started Thursday and described as a feeling of uncomfortableness and left sided chest ache. Pt reporting symptoms were persistent but alleviated when she was "distracted." She denies any exertional, pleuritic, or reproducible components. Symtoms appreciated Th and Fri. On Saturday, she was busy at work and felt fine. She came home and slept and woke up with moderate-severe squeezing chest discomfort associated with a heaviness in her left arm lasting about an hour. No other associated symptoms. Denied diaphoresis, nausea/vomiting, palpitations, presyncope, light headedness. Of note, she rolls own cig. And smokes about 15 cig/day x 30 years. Her father diet of an MI at the age of 59.     In the ED, was initially found to be significantly hypertensive with SBP of 200. On my evaluation, I rechecked it and found it to be 153/71. She said symptoms had dramatically improved since she woke up. ECG showed non specific findings including early repolarization and pr depression. Labs notable for a troponin of 2.8. Hg, creatinine wnl. CXR unremarkable.      Assessment: NSTEMI >> type II MI, she has moderate to high pre test probability of CAD given risk factors of HTN, menopause, active tobacco use.    Plan:   -continue ACS  -pending IC consult  - increase lopressor 25mg q8 hours with holding parameters SBP < 100 or P < 55 bpm.  - prn SL nitro  - cardiac rehab, annual influenza  - smoking cessation and education  "

## 2022-12-04 NOTE — SUBJECTIVE & OBJECTIVE
Past Medical History:   Diagnosis Date    Anxiety     Depression        Past Surgical History:   Procedure Laterality Date    NONE         Review of patient's allergies indicates:  No Known Allergies    No current facility-administered medications on file prior to encounter.     Current Outpatient Medications on File Prior to Encounter   Medication Sig    buPROPion (WELLBUTRIN SR) 150 MG TBSR 12 hr tablet Take 150 mg by mouth 2 (two) times daily.    busPIRone (BUSPAR) 15 MG tablet Take 15 mg by mouth 3 (three) times daily.    cetirizine (ZYRTEC) 10 MG tablet Take 1 tablet (10 mg total) by mouth once daily. (Patient not taking: Reported on 10/20/2022)     Family History       Problem Relation (Age of Onset)    Diabetes Paternal Grandmother    Heart attack Father (59)          Tobacco Use    Smoking status: Every Day     Packs/day: 0.67     Years: 23.00     Pack years: 15.41     Types: Cigarettes     Start date: 8/25/1992    Smokeless tobacco: Never   Substance and Sexual Activity    Alcohol use: Yes     Alcohol/week: 18.0 standard drinks     Types: 18 Cans of beer per week     Comment: daily    Drug use: No    Sexual activity: Not on file     Review of Systems   Constitutional: Negative for chills, decreased appetite and fever.   HENT:  Negative for congestion and sore throat.    Eyes:  Negative for blurred vision and discharge.   Cardiovascular:  Positive for chest pain. Negative for claudication, cyanosis, dyspnea on exertion and leg swelling.   Respiratory:  Negative for cough, hemoptysis and shortness of breath.    Endocrine: Negative for cold intolerance and heat intolerance.   Skin:  Negative for color change.   Musculoskeletal:  Negative for muscle weakness and myalgias.   Gastrointestinal:  Negative for bloating and abdominal pain.   Neurological:  Negative for dizziness, focal weakness and weakness.   Psychiatric/Behavioral:  Negative for altered mental status and depression.    Objective:     Vital Signs  (Most Recent):  Temp: 97.8 °F (36.6 °C) (12/04/22 0133)  Pulse: 93 (12/04/22 0133)  Resp: 14 (12/04/22 0133)  BP: (!) 205/99 (12/04/22 0133)  SpO2: 99 % (12/04/22 0133)   Vital Signs (24h Range):  Temp:  [97.8 °F (36.6 °C)] 97.8 °F (36.6 °C)  Pulse:  [93] 93  Resp:  [14] 14  SpO2:  [99 %] 99 %  BP: (205)/(99) 205/99        There is no height or weight on file to calculate BMI.    SpO2: 99 %  O2 Device (Oxygen Therapy): room air    No intake or output data in the 24 hours ending 12/04/22 0436    Lines/Drains/Airways       Peripheral Intravenous Line  Duration                  Peripheral IV - Single Lumen 12/04/22 0241 20 G Right Antecubital <1 day                    Physical Exam  Constitutional:       Appearance: She is well-developed.   HENT:      Head: Normocephalic and atraumatic.      Right Ear: External ear normal.      Left Ear: External ear normal.   Eyes:      Conjunctiva/sclera: Conjunctivae normal.   Cardiovascular:      Rate and Rhythm: Normal rate and regular rhythm.      Pulses: Intact distal pulses.           Radial pulses are 2+ on the right side and 2+ on the left side.      Heart sounds: Normal heart sounds.   Pulmonary:      Effort: Pulmonary effort is normal. No respiratory distress.      Breath sounds: Normal breath sounds. No wheezing.   Abdominal:      General: Bowel sounds are normal. There is no distension.      Palpations: Abdomen is soft.      Tenderness: There is no abdominal tenderness.   Musculoskeletal:         General: Normal range of motion.      Cervical back: Normal range of motion and neck supple.   Skin:     General: Skin is warm and dry.   Neurological:      Mental Status: She is alert and oriented to person, place, and time.   Psychiatric:         Mood and Affect: Mood normal.         Behavior: Behavior normal.       Significant Labs: CMP   Recent Labs   Lab 12/04/22  0241      K 4.1      CO2 27      BUN 12   CREATININE 0.8   CALCIUM 9.8   PROT 7.7   ALBUMIN  4.5   BILITOT 0.3   ALKPHOS 96   AST 24   ALT 22   ANIONGAP 10   , CBC   Recent Labs   Lab 12/04/22  0241   WBC 9.22   HGB 14.1   HCT 44.2      , INR No results for input(s): INR, PROTIME in the last 48 hours., and Troponin   Recent Labs   Lab 12/04/22  0241   TROPONINI 2.408*       Significant Imaging: EKG: see hpi

## 2022-12-04 NOTE — Clinical Note
An angiography was performed of the LV. The angiography was performed via power injection. The injected amount was 36 mL contrast at 12 mL/s. The PSI from the power injection was 1000.

## 2022-12-04 NOTE — ED PROVIDER NOTES
Source of History   Patient    Chief Complaint   Chest Pain (Thursday pt reports she woke up with chest pain. Tonight she was hurting again and couldn't go to sleep. Denies radiation. Denies nausea or SOB. Reports pain is constant. )      History Of Present Illness   Megan Mccall is a 62 y.o. female presenting with chest discomfort that has been present for about 36 hours.  There are no exacerbating or alleviating factors except the patient states she does not feel it when she is not thinking about it.  She wonders if it might be associated with some anxiety or stress.  No associated symptoms.  It has not impacted her daily activities.    Review Of Systems   As per HPI and below:  General: No fever.  No chills.  Eyes: No visual changes.  Head: No headache.    Integument: No rashes or lesions.  Chest: No shortness of breath.  Cardiovascular: Notes chest pain.  Abdomen: No abdominal pain.  No nausea or vomiting.  Urinary: No abnormal urination.  Neurologic: No focal weakness.  No numbness.  Hematologic: No easy bruising.  Endocrine: No excessive thirst or urination.      Review of patient's allergies indicates:  No Known Allergies    No current facility-administered medications on file prior to encounter.     Current Outpatient Medications on File Prior to Encounter   Medication Sig Dispense Refill    buPROPion (WELLBUTRIN SR) 150 MG TBSR 12 hr tablet Take 150 mg by mouth 2 (two) times daily.      busPIRone (BUSPAR) 15 MG tablet Take 15 mg by mouth 3 (three) times daily.      cetirizine (ZYRTEC) 10 MG tablet Take 1 tablet (10 mg total) by mouth once daily. (Patient not taking: Reported on 10/20/2022) 30 tablet 2       Past History   As per HPI and below:  Past Medical History:   Diagnosis Date    Anxiety     Depression      Past Surgical History:   Procedure Laterality Date    NONE         Social History     Socioeconomic History    Marital status: Single   Tobacco Use    Smoking status: Every Day     Packs/day:  0.67     Years: 23.00     Pack years: 15.41     Types: Cigarettes     Start date: 8/25/1992    Smokeless tobacco: Never   Substance and Sexual Activity    Alcohol use: Yes     Alcohol/week: 18.0 standard drinks     Types: 18 Cans of beer per week     Comment: daily    Drug use: No   Social History Narrative    The patient does not exercise regularly ().      Rates diet as good.      She is not satisfied with weight.           Family History   Problem Relation Age of Onset    Heart attack Father 59    Diabetes Paternal Grandmother        Physical Exam     Vitals:    12/04/22 0133 12/04/22 0430 12/04/22 0449   BP: (!) 205/99 (!) 156/97 (!) 173/76   BP Location:   Right arm   Patient Position:   Lying   Pulse: 93 95 88   Resp: 14 11 18   Temp: 97.8 °F (36.6 °C)     TempSrc: Oral     SpO2: 99% 99% 100%     Appearance: No acute distress.  Skin: No rashes seen.  Good turgor.  No abrasions.  No ecchymoses.  Eyes: No conjunctival injection.  ENT: Oropharynx clear.    Chest: Clear to auscultation bilaterally.  Good air movement.  No wheezes.  No rhonchi.  Cardiovascular: Regular rate and rhythm.  No murmurs. No gallops. No rubs.  Abdomen: Soft.  Not distended.  Nontender.  No guarding.  No rebound.  Musculoskeletal: Good range of motion all joints.  No deformities.  Neck supple.  No meningismus.  Neurologic: Motor intact.  Sensation intact.  Cerebellar intact.  Cranial nerves intact.  Mental Status:  Alert and oriented x 3.  Appropriate, conversant.      Initial MDM   Chest pain that has been present for about 36 hours, intermittently, no obvious exacerbating/alleviating factors.  She associated with anxiety.  Has been able to perform daily activities without issues, in fact they distract her and make the pain go away.  She deals with depression and anxiety on a regular basis and takes medication for both.  I am suspicious this is anxiety given the duration of symptoms and how distraction relieves the pain.  However, given  her age, I am going to check a cardiac workup.  Patient states she normally doesn't have elevated BP but does occasionally with stress.    I decided to obtain the patient's medical records.    Medications   aspirin tablet 325 mg (325 mg Oral Given 12/4/22 7004)   nitroGLYCERIN SL tablet 0.4 mg (0.4 mg Sublingual Given 12/4/22 2600)       Results and Course     Labs Reviewed   CBC W/ AUTO DIFFERENTIAL - Abnormal; Notable for the following components:       Result Value     (*)     MCH 32.0 (*)     MCHC 31.9 (*)     Gran % 77.2 (*)     Lymph % 15.4 (*)     Mono % 3.9 (*)     All other components within normal limits   TROPONIN I - Abnormal; Notable for the following components:    Troponin I 2.408 (*)     All other components within normal limits   HIV 1 / 2 ANTIBODY    Narrative:     Release to patient->Immediate   HEPATITIS C ANTIBODY    Narrative:     Release to patient->Immediate   COMPREHENSIVE METABOLIC PANEL   B-TYPE NATRIURETIC PEPTIDE   TROPONIN I       Imaging Results              X-Ray Chest AP Portable (Final result)  Result time 12/04/22 03:54:07      Final result by Francisco Pinto MD (12/04/22 03:54:07)                   Impression:      No radiographic evidence of acute intrathoracic process on this single view.      Electronically signed by: Francisco Pinto MD  Date:    12/04/2022  Time:    03:54               Narrative:    EXAMINATION:  XR CHEST AP PORTABLE    CLINICAL HISTORY:  Chest Pain;    TECHNIQUE:  Single frontal view of the chest was performed.    COMPARISON:  None    FINDINGS:  The cardiac silhouette appears within normal limits.  Lungs are symmetrically expanded with mild coarse interstitial attenuation bilaterally which may relate to emphysema/COPD.  No large confluent airspace consolidation identified.  No significant volume of pleural fluid or pneumothorax identified.  Osseous structures appear intact.                                    Additional MDM:   Heart Score:     History:          Moderately suspicious.  ECG:             Nonspecific repolarisation disturbance  Age:               45-65 years  Risk factors: no risk factors known  Troponin:       >2x normal limit  Final Score: 5      ED Course as of 12/04/22 0450   Sun Dec 04, 2022   0319 WBC: 9.22 [DC]   0319 Hemoglobin: 14.1 [DC]   0319 Platelets: 242 [DC]   0350 Troponin I(!): 2.408 [DC]   0356 Discussed with cardiology   [DC]   0356 Hepatitis C Ab: Non-reactive [DC]   0356 HIV 1/2 Ag/Ab: Non-reactive [DC]   0356 BNP: 29 [DC]   0356 Creatinine: 0.8 [DC]   0356 X-Ray Chest AP Portable  CXR shows no acute disease per my independent interpretation.     [DC]      ED Course User Index  [DC] Tunde Olmstead MD     Critical Care   Date: 12/04/2022  Performed by: Tunde Olmstead MD   Authorized by: Tunde Olmstead MD    Total critical care time (exclusive of procedural time) : 40 minutes  Critical care was necessary to treat or prevent imminent or life-threatening deterioration of the following conditions:  NSTEMI        MDM, Impression and Plan   62 y.o. female with NSTEMI in the setting of chest pain off and on for 36 hours.  Did not really seem like ACS, but troponin is markedly elevated.  Discussed with hospital medicine and cardiology.  Hospital Medicine to admit.  Will start ACS protocol.         Final diagnoses:  [R07.9] Chest pain  [I21.4] NSTEMI (non-ST elevated myocardial infarction) (Primary)        ED Disposition Condition    Admit Stable                  Tunde Olmstead MD  12/04/22 0457

## 2022-12-04 NOTE — H&P
[facility] - Cardiology [facility]  History & Physical    Patient Name: Megan Mccall  MRN: [id number]  Patient Class: IP- Inpatient  Admission Date: 12/4/2022  Attending Physician: Tunde Mcghee MD   Primary Care Provider: [facility]         Patient information was obtained from patient and ER records.     Subjective:     Principal Problem:NSTEMI (non-ST elevated myocardial infarction)    Chief Complaint:   Chief Complaint   Patient presents with    Chest Pain     Thursday pt reports she woke up with chest pain. Tonight she was hurting again and couldn't go to sleep. Denies radiation. Denies nausea or SOB. Reports pain is constant.         HPI: Ms Mccall is a 63 yo F with PMH depression, anxiety, tobacco use who presents with 2 days of chest pain. She reports that it has felt like a dull ache with some pressure and has been intermittent. 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 She has had no nausea, vomiting, or dizziness. She reports an associated mild headache.     Patient reports that she rolls her own cigarettes and smokes the equivalent of 15 cigarettes per day. She also drinks 2-3 beers per day. 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    In the ED, HR 93, /99, O2 97 on room air. Labs notable for no leukocytosis or anemia, troponin 2.4, 1.8 on repeat. EKG without ST changes. Given ASA and brilinta load, SL nitro. Cardiology consulted and recommend initiating ACS protocol. Admitted for further management.       Past Medical History:   Diagnosis Date    Anxiety     Depression        Past Surgical History:   Procedure Laterality Date    NONE         Review of patient's allergies indicates:  No Known Allergies    No current facility-administered medications on file prior to encounter.     Current Outpatient Medications on File Prior to Encounter   Medication Sig     buPROPion (WELLBUTRIN SR) 150 MG TBSR 12 hr tablet Take 150 mg by mouth 2 (two) times daily.    busPIRone (BUSPAR) 15 MG tablet Take 15 mg by mouth 3 (three) times daily.    cetirizine (ZYRTEC) 10 MG tablet Take 1 tablet (10 mg total) by mouth once daily. (Patient not taking: Reported on 10/20/2022)     Family History       Problem Relation (Age of Onset)    Diabetes Paternal Grandmother    Heart attack Father (59)    Heart disease Father          Tobacco Use    Smoking status: Every Day     Packs/day: 0.67     Years: 23.00     Pack years: 15.41     Types: Cigarettes     Start date: 8/25/1992    Smokeless tobacco: Never   Substance and Sexual Activity    Alcohol use: Yes     Alcohol/week: 18.0 standard drinks     Types: 18 Cans of beer per week     Comment: daily    Drug use: No    Sexual activity: Not on file     Review of Systems   Constitutional:  Negative for chills and fever.   HENT:  Negative for rhinorrhea and sore throat.    Eyes:  Negative for visual disturbance.   Respiratory:  Negative for cough and shortness of breath.    Cardiovascular:  Positive for chest pain. Negative for leg swelling.   Gastrointestinal:  Negative for diarrhea, nausea and vomiting.   Genitourinary:  Negative for dysuria.   Musculoskeletal:  Negative for arthralgias and myalgias.   Skin:  Negative for rash.   Neurological:  Positive for headaches. Negative for light-headedness.   Psychiatric/Behavioral:  Negative for agitation and confusion.    Objective:     Vital Signs (Most Recent):  Temp: 97.5 °F (36.4 °C) (12/04/22 0606)  Pulse: 86 (12/04/22 0606)  Resp: 20 (12/04/22 0606)  BP: (!) 188/85 (12/04/22 0606)  SpO2: 97 % (12/04/22 0606)   Vital Signs (24h Range):  Temp:  [97.5 °F (36.4 °C)-97.8 °F (36.6 °C)] 97.5 °F (36.4 °C)  Pulse:  [86-97] 86  Resp:  [11-20] 20  SpO2:  [97 %-100 %] 97 %  BP: (134-205)/(67-99) 188/85        There is no height or weight on file to calculate BMI.    Physical Exam  HENT:      Head:  Normocephalic and atraumatic.      Right Ear: External ear normal.      Left Ear: External ear normal.      Nose: Nose normal.      Mouth/Throat:      Mouth: Mucous membranes are moist.      Pharynx: Oropharynx is clear.   Eyes:      General: No scleral icterus.     Extraocular Movements: Extraocular movements intact.      Conjunctiva/sclera: Conjunctivae normal.   Cardiovascular:      Rate and Rhythm: Normal rate and regular rhythm.   Pulmonary:      Effort: Pulmonary effort is normal.      Breath sounds: Normal breath sounds. No wheezing or rales.   Abdominal:      General: Abdomen is flat. Bowel sounds are normal. There is no distension.      Palpations: Abdomen is soft.      Tenderness: There is no abdominal tenderness.   Musculoskeletal:         General: Normal range of motion.      Cervical back: Normal range of motion.   Skin:     General: Skin is warm and dry.   Neurological:      General: No focal deficit present.      Mental Status: She is alert.   Psychiatric:         Mood and Affect: Mood normal.         Behavior: Behavior normal.           Significant Labs: All pertinent labs within the past 24 hours have been reviewed.  Troponin:   Recent Labs   Lab 12/04/22  0241 12/04/22  0530   TROPONINI 2.408* 1.860*       Significant Imaging: I have reviewed all pertinent imaging results/findings within the past 24 hours.    Assessment/Plan:     * NSTEMI (non-ST elevated myocardial infarction)  61 yo F with 2 days intermittent aching chest pain, extensive smoking history, alcohol use (2-3 beers/day). Physical exam is normal. Troponin initially 2.8, trending down. EKG without ST changes. ASA and brilinta loaded in ED. Cardiology consulted, recommend ACS protocol and interventional cards consult.    -NPO pending ICards eval  -TTE ordered  -trending trop x3 unless it rises, then continue to trend  -f/u lipid panel, tsh, HbA1c  -heparin gtt  -lopressor started per cards  -ASA and brilinta daily dosing to start 12/5  per cards  -atorvastatin 80mg per cards      Tobacco use  Patient reports rolling her own cigarettes and smoking the equivalent of 15 cigarettes per day. Educated on the effects of smoking on heart health.      Anxiety and depression  Continue home bupropion and buspirone          VTE Risk Mitigation (From admission, onward)         Ordered     IP VTE LOW RISK PATIENT  Once         12/04/22 0457                   LAURENCE WILLOUGHBY MD  Department of Hospital Medicine   [facility] - Cardiology Stepdown

## 2022-12-04 NOTE — ED NOTES
Received report from KELVIN Pizarro. The patient is awake, alert and cooperative with a calm affect, patient is aware of environment, airway is open and patent, respirations are spontaneous, normal effort and rate noted, skin warm and dry, moves all extremities well, appearance: no apparent distress noted, bed placed in low position, side rails up x 2, call light is within reach of patient, explanation of care provided to patient, alarms set and turned on for monitor, pulse ox, plan of care: observe and reassure, position of comfort, patient offers no complaints at this time, awaiting further MD orders and bed assignment, will continue to monitor.

## 2022-12-04 NOTE — NURSING
Pt's repeat troponin came back 3.799.Pt denies chest pain, VSS.Continuing heparin gtts as per ACS protocol.Brad Tomlinson DO notified.

## 2022-12-04 NOTE — CONSULTS
"[facility] - Emergency Dept  Cardiology  Consult Note    Patient Name: Megan Mccall  MRN: [id number]  Admission Date: 12/4/2022  Hospital Length of Stay: 0 days  Code Status: No Order   Attending Provider: Tunde Olmstead MD   Consulting Provider: Anselmo Borrero MD  Primary Care Physician: [facility]  Principal Problem:<principal problem not specified>    Patient information was obtained from patient and ER records.     Inpatient consult to Cardiology  Consult performed by: Anselmo Borrero MD  Consult ordered by: Tunde Olmstead MD        Subjective:     Chief Complaint:  Chest discomfort     HPI:   Ms. Megan Mccall is a 63 yo F with pmhx s/f Depression, anxiety, and tobacco abuse. She presents today with symptoms of chest discomfort x 2 days. Symptoms started Thursday and described as a feeling of uncomfortableness and left sided chest ache. Pt reporting symptoms were persistent but alleviated when she was "distracted." She denies any exertional, pleuritic, or reproducible components. Symtoms appreciated Th and Fri. On Saturday, she was busy at work and felt fine. She came home and slept and woke up with moderate-severe squeezing chest discomfort associated with a heaviness in her left arm lasting about an hour. No other associated symptoms. Denied diaphoresis, nausea/vomiting, palpitations, presyncope, light headedness. Of note, she rolls own cig. And smokes about 15 cig/day x 30 years. Her father diet of an MI at the age of 59.    In the ED, was initially found to be significantly hypertensive with SBP of 200. On my evaluation, I rechecked it and found it to be 153/71. She said symptoms had dramatically improved since she woke up. ECG showed non specific findings including early repolarization and pr depression. Labs notable for a troponin of 2.8. Hg, creatinine wnl. CXR unremarkable.       Past Medical History:   Diagnosis Date    Anxiety     Depression        Past Surgical History:   Procedure " Laterality Date    NONE         Review of patient's allergies indicates:  No Known Allergies    No current facility-administered medications on file prior to encounter.     Current Outpatient Medications on File Prior to Encounter   Medication Sig    buPROPion (WELLBUTRIN SR) 150 MG TBSR 12 hr tablet Take 150 mg by mouth 2 (two) times daily.    busPIRone (BUSPAR) 15 MG tablet Take 15 mg by mouth 3 (three) times daily.    cetirizine (ZYRTEC) 10 MG tablet Take 1 tablet (10 mg total) by mouth once daily. (Patient not taking: Reported on 10/20/2022)     Family History       Problem Relation (Age of Onset)    Diabetes Paternal Grandmother    Heart attack Father (59)          Tobacco Use    Smoking status: Every Day     Packs/day: 0.67     Years: 23.00     Pack years: 15.41     Types: Cigarettes     Start date: 8/25/1992    Smokeless tobacco: Never   Substance and Sexual Activity    Alcohol use: Yes     Alcohol/week: 18.0 standard drinks     Types: 18 Cans of beer per week     Comment: daily    Drug use: No    Sexual activity: Not on file     Review of Systems   Constitutional: Negative for chills, decreased appetite and fever.   HENT:  Negative for congestion and sore throat.    Eyes:  Negative for blurred vision and discharge.   Cardiovascular:  Positive for chest pain. Negative for claudication, cyanosis, dyspnea on exertion and leg swelling.   Respiratory:  Negative for cough, hemoptysis and shortness of breath.    Endocrine: Negative for cold intolerance and heat intolerance.   Skin:  Negative for color change.   Musculoskeletal:  Negative for muscle weakness and myalgias.   Gastrointestinal:  Negative for bloating and abdominal pain.   Neurological:  Negative for dizziness, focal weakness and weakness.   Psychiatric/Behavioral:  Negative for altered mental status and depression.    Objective:     Vital Signs (Most Recent):  Temp: 97.8 °F (36.6 °C) (12/04/22 0133)  Pulse: 93 (12/04/22 0133)  Resp: 14  (12/04/22 0133)  BP: (!) 205/99 (12/04/22 0133)  SpO2: 99 % (12/04/22 0133)   Vital Signs (24h Range):  Temp:  [97.8 °F (36.6 °C)] 97.8 °F (36.6 °C)  Pulse:  [93] 93  Resp:  [14] 14  SpO2:  [99 %] 99 %  BP: (205)/(99) 205/99        There is no height or weight on file to calculate BMI.    SpO2: 99 %  O2 Device (Oxygen Therapy): room air    No intake or output data in the 24 hours ending 12/04/22 0436    Lines/Drains/Airways       Peripheral Intravenous Line  Duration                  Peripheral IV - Single Lumen 12/04/22 0241 20 G Right Antecubital <1 day                    Physical Exam  Constitutional:       Appearance: She is well-developed.   HENT:      Head: Normocephalic and atraumatic.      Right Ear: External ear normal.      Left Ear: External ear normal.   Eyes:      Conjunctiva/sclera: Conjunctivae normal.   Cardiovascular:      Rate and Rhythm: Normal rate and regular rhythm.      Pulses: Intact distal pulses.           Radial pulses are 2+ on the right side and 2+ on the left side.      Heart sounds: Normal heart sounds.   Pulmonary:      Effort: Pulmonary effort is normal. No respiratory distress.      Breath sounds: Normal breath sounds. No wheezing.   Abdominal:      General: Bowel sounds are normal. There is no distension.      Palpations: Abdomen is soft.      Tenderness: There is no abdominal tenderness.   Musculoskeletal:         General: Normal range of motion.      Cervical back: Normal range of motion and neck supple.   Skin:     General: Skin is warm and dry.   Neurological:      Mental Status: She is alert and oriented to person, place, and time.   Psychiatric:         Mood and Affect: Mood normal.         Behavior: Behavior normal.       Significant Labs: CMP   Recent Labs   Lab 12/04/22  0241      K 4.1      CO2 27      BUN 12   CREATININE 0.8   CALCIUM 9.8   PROT 7.7   ALBUMIN 4.5   BILITOT 0.3   ALKPHOS 96   AST 24   ALT 22   ANIONGAP 10   , CBC   Recent Labs   Lab  "22  0241   WBC 9.22   HGB 14.1   HCT 44.2      , INR No results for input(s): INR, PROTIME in the last 48 hours., and Troponin   Recent Labs   Lab 22  024   TROPONINI 2.408*       Significant Imaging: EKG: see hpi    Assessment and Plan:     NSTEMI (non-ST elevated myocardial infarction)  61 yo F presenting with signs and symptoms of NSTEMI.  Risk factors include tobacco use, possible family hx (father  at age 59)   ECG shows non specific findings. Troponin elevated to 2.8.  Reports symptoms are significantly improved now but admits to the "mild ache" in her chest that has been persistent for the past 2 days.  Continue to trend trop q6h x 3 evaluations. Continue to trend if uprising  Order formal echo  I will placed interventional cardiology consult to be evaluated in am  Start on ACS protocol with heparin ggt. Load with asa 325 and brilinta 180 mg now followed by daily and bid dosing respectively.  Obtain tsh, lipid panel, and A1c level with am labs  No signs of heart failure on exam. Start on metoprolol tartrate 25 mg po bid and atorvastatin 80 mg daily  Cardiology will continue to follow. If squeezing chest discomfort recurs, obtain stat ecg, trop and treat with slNTG. Notify cardiology if symptoms do not resolve.            VTE Risk Mitigation (From admission, onward)    None          Thank you for your consult. I will follow-up with patient. Please contact us if you have any additional questions.    Anselmo Borrero MD  Cardiology   [facility] - Emergency Dept    "

## 2022-12-04 NOTE — PLAN OF CARE
Pt arrived to floor in room 309. Pt AAOx4. Respirations even and unlabored. Equal chest rise and fall noted. Denies GI upset at this time. Denies other distress or c/o pain. Telemetry box placed on pt. Pt oriented to room. Call light within reach. Side rails up x2. Bed wheels locked and in lowest position. Hospital medications reviewed with patient. Primary care made aware of patient arrival to floor. Will continue to monitor. Start patient plan of care.

## 2022-12-04 NOTE — PLAN OF CARE
SW spoke to Pt at bedside for initial discharge planning assessment. Pt lives with alone. Longtime friend, Golden Lynch, will transport Pt and assist after D/C. Patient was functioning independently. SW is following for post-acute planning needs.      Jazmín Palacios, [facility] [place]  Case Management Department  [email or web address]       12/04/22 1634   Discharge Assessment   Confirmed/corrected address, phone number and insurance Yes   Confirmed Demographics Correct on Facesheet   Source of Information patient   Communicated ABDI with patient/caregiver No   Lives With alone   Do you expect to return to your current living situation? Yes   Do you have help at home or someone to help you manage your care at home? Yes   Who are your caregiver(s) and their phone number(s)? Golden Cris [phone]   Prior to hospitilization cognitive status: Alert/Oriented   Current cognitive status: Alert/Oriented   Walking or Climbing Stairs Difficulty none   Dressing/Bathing Difficulty none   Equipment Currently Used at Home none   Readmission within 30 days? No   Patient currently being followed by outpatient case management? No   Do you currently have service(s) that help you manage your care at home? No   Do you take prescription medications? Yes   Do you have prescription coverage? Yes   Coverage BCBS   Do you have any problems affording any of your prescribed medications? No   Is the patient taking medications as prescribed? yes   Who is going to help you get home at discharge? Golden Cris [phone]   How do you get to doctors appointments? car, drives self;family or friend will provide   Are you on dialysis? No   Do you take coumadin? No   Discharge Plan A Home   Discharge Plan B Home;Home Health   DME Needed Upon Discharge  none   Discharge Plan discussed with: Patient   Discharge Barriers Identified None   Stress   Do you feel stress - tense, restless, nervous, or anxious, or unable to sleep at night because your mind is troubled  all the time - these days? Rather much

## 2022-12-04 NOTE — LETTER
"Megan Pattersonjoe Mccall was seen and admitted to our hospital  on 12/4/2022 for evaluation and treatment.     She may return to work on December 27th, 2022. Please allow her 3 weeks to recover per recommendations by our specialists.     If you have any questions or concerns, please feel free to reach out.                                 [place], DO   Department of Hospital Medicine           "

## 2022-12-05 ENCOUNTER — TELEPHONE (OUTPATIENT)
Dept: CARDIAC REHAB | Facility: CLINIC | Age: 62
End: 2022-12-05
Payer: COMMERCIAL

## 2022-12-05 VITALS
DIASTOLIC BLOOD PRESSURE: 89 MMHG | BODY MASS INDEX: 18.27 KG/M2 | HEART RATE: 66 BPM | HEIGHT: 64 IN | OXYGEN SATURATION: 94 % | TEMPERATURE: 97 F | RESPIRATION RATE: 17 BRPM | WEIGHT: 107 LBS | SYSTOLIC BLOOD PRESSURE: 150 MMHG

## 2022-12-05 PROBLEM — I51.81 TAKOTSUBO CARDIOMYOPATHY: Status: ACTIVE | Noted: 2022-12-05

## 2022-12-05 PROBLEM — R03.0 ELEVATED BLOOD PRESSURE READING WITHOUT DIAGNOSIS OF HYPERTENSION: Status: ACTIVE | Noted: 2022-12-05

## 2022-12-05 LAB
ABO + RH BLD: NORMAL
ALBUMIN SERPL BCP-MCNC: 3.6 G/DL (ref 3.5–5.2)
ALP SERPL-CCNC: 75 U/L (ref 55–135)
ALT SERPL W/O P-5'-P-CCNC: 17 U/L (ref 10–44)
ANION GAP SERPL CALC-SCNC: 6 MMOL/L (ref 8–16)
APTT BLDCRRT: 36.7 SEC (ref 21–32)
APTT BLDCRRT: 50.2 SEC (ref 21–32)
ASCENDING AORTA: 2.65 CM
AST SERPL-CCNC: 18 U/L (ref 10–40)
AV INDEX (PROSTH): 0.72
AV MEAN GRADIENT: 2 MMHG
AV PEAK GRADIENT: 4 MMHG
AV VALVE AREA: 2.29 CM2
AV VELOCITY RATIO: 0.68
BASOPHILS # BLD AUTO: 0.04 K/UL (ref 0–0.2)
BASOPHILS NFR BLD: 0.7 % (ref 0–1.9)
BILIRUB SERPL-MCNC: 0.7 MG/DL (ref 0.1–1)
BLD GP AB SCN CELLS X3 SERPL QL: NORMAL
BSA FOR ECHO PROCEDURE: 1.48 M2
BUN SERPL-MCNC: 11 MG/DL (ref 8–23)
CALCIUM SERPL-MCNC: 9 MG/DL (ref 8.7–10.5)
CATH EF ESTIMATED: 60 %
CHLORIDE SERPL-SCNC: 108 MMOL/L (ref 95–110)
CO2 SERPL-SCNC: 27 MMOL/L (ref 23–29)
CREAT SERPL-MCNC: 0.8 MG/DL (ref 0.5–1.4)
CV ECHO LV RWT: 0.52 CM
DIFFERENTIAL METHOD: ABNORMAL
DOP CALC AO PEAK VEL: 1.04 M/S
DOP CALC AO VTI: 22.4 CM
DOP CALC LVOT AREA: 3.2 CM2
DOP CALC LVOT DIAMETER: 2.01 CM
DOP CALC LVOT PEAK VEL: 0.71 M/S
DOP CALC LVOT STROKE VOLUME: 51.35 CM3
DOP CALCLVOT PEAK VEL VTI: 16.19 CM
E WAVE DECELERATION TIME: 182.9 MSEC
E/A RATIO: 0.69
E/E' RATIO: 9.38 M/S
ECHO LV POSTERIOR WALL: 0.97 CM (ref 0.6–1.1)
EJECTION FRACTION: 55 %
EOSINOPHIL # BLD AUTO: 0.3 K/UL (ref 0–0.5)
EOSINOPHIL NFR BLD: 4.5 % (ref 0–8)
ERYTHROCYTE [DISTWIDTH] IN BLOOD BY AUTOMATED COUNT: 13.3 % (ref 11.5–14.5)
EST. GFR  (NO RACE VARIABLE): >60 ML/MIN/1.73 M^2
FRACTIONAL SHORTENING: 29 % (ref 28–44)
GLUCOSE SERPL-MCNC: 97 MG/DL (ref 70–110)
HCT VFR BLD AUTO: 40.7 % (ref 37–48.5)
HGB BLD-MCNC: 13 G/DL (ref 12–16)
IMM GRANULOCYTES # BLD AUTO: 0.02 K/UL (ref 0–0.04)
IMM GRANULOCYTES NFR BLD AUTO: 0.3 % (ref 0–0.5)
INTERVENTRICULAR SEPTUM: 0.97 CM (ref 0.6–1.1)
IVRT: 88.49 MSEC
LA MAJOR: 4.47 CM
LA MINOR: 4.26 CM
LA WIDTH: 3.83 CM
LEFT ATRIUM SIZE: 2.46 CM
LEFT ATRIUM VOLUME INDEX MOD: 34.7 ML/M2
LEFT ATRIUM VOLUME INDEX: 23.3 ML/M2
LEFT ATRIUM VOLUME MOD: 52.01 CM3
LEFT ATRIUM VOLUME: 34.94 CM3
LEFT INTERNAL DIMENSION IN SYSTOLE: 2.63 CM (ref 2.1–4)
LEFT VENTRICLE DIASTOLIC VOLUME INDEX: 39.37 ML/M2
LEFT VENTRICLE DIASTOLIC VOLUME: 59.05 ML
LEFT VENTRICLE MASS INDEX: 72 G/M2
LEFT VENTRICLE SYSTOLIC VOLUME INDEX: 16.8 ML/M2
LEFT VENTRICLE SYSTOLIC VOLUME: 25.21 ML
LEFT VENTRICULAR INTERNAL DIMENSION IN DIASTOLE: 3.72 CM (ref 3.5–6)
LEFT VENTRICULAR MASS: 108.63 G
LV LATERAL E/E' RATIO: 8.71 M/S
LV SEPTAL E/E' RATIO: 10.17 M/S
LYMPHOCYTES # BLD AUTO: 2.8 K/UL (ref 1–4.8)
LYMPHOCYTES NFR BLD: 47.1 % (ref 18–48)
MAGNESIUM SERPL-MCNC: 2 MG/DL (ref 1.6–2.6)
MCH RBC QN AUTO: 32.1 PG (ref 27–31)
MCHC RBC AUTO-ENTMCNC: 31.9 G/DL (ref 32–36)
MCV RBC AUTO: 101 FL (ref 82–98)
MONOCYTES # BLD AUTO: 0.4 K/UL (ref 0.3–1)
MONOCYTES NFR BLD: 6.2 % (ref 4–15)
MV PEAK A VEL: 0.88 M/S
MV PEAK E VEL: 0.61 M/S
MV STENOSIS PRESSURE HALF TIME: 53.04 MS
MV VALVE AREA P 1/2 METHOD: 4.15 CM2
NEUTROPHILS # BLD AUTO: 2.4 K/UL (ref 1.8–7.7)
NEUTROPHILS NFR BLD: 41.2 % (ref 38–73)
NRBC BLD-RTO: 0 /100 WBC
PHOSPHATE SERPL-MCNC: 4.1 MG/DL (ref 2.7–4.5)
PISA TR MAX VEL: 2.23 M/S
PLATELET # BLD AUTO: 218 K/UL (ref 150–450)
PMV BLD AUTO: 9.8 FL (ref 9.2–12.9)
POTASSIUM SERPL-SCNC: 4.1 MMOL/L (ref 3.5–5.1)
PROT SERPL-MCNC: 6.2 G/DL (ref 6–8.4)
RA MAJOR: 3.32 CM
RA PRESSURE: 3 MMHG
RA WIDTH: 3.35 CM
RBC # BLD AUTO: 4.05 M/UL (ref 4–5.4)
RIGHT VENTRICULAR END-DIASTOLIC DIMENSION: 3.01 CM
RV TISSUE DOPPLER FREE WALL SYSTOLIC VELOCITY 1 (APICAL 4 CHAMBER VIEW): 10.7 CM/S
SINUS: 2.95 CM
SODIUM SERPL-SCNC: 141 MMOL/L (ref 136–145)
STJ: 2.23 CM
TDI LATERAL: 0.07 M/S
TDI SEPTAL: 0.06 M/S
TDI: 0.07 M/S
TR MAX PG: 20 MMHG
TRICUSPID ANNULAR PLANE SYSTOLIC EXCURSION: 1.77 CM
TROPONIN I SERPL DL<=0.01 NG/ML-MCNC: 2.43 NG/ML (ref 0–0.03)
TV REST PULMONARY ARTERY PRESSURE: 23 MMHG
WBC # BLD AUTO: 5.84 K/UL (ref 3.9–12.7)

## 2022-12-05 PROCEDURE — 93458 L HRT ARTERY/VENTRICLE ANGIO: CPT | Mod: 26,,, | Performed by: INTERNAL MEDICINE

## 2022-12-05 PROCEDURE — 99232 PR SUBSEQUENT HOSPITAL CARE,LEVL II: ICD-10-PCS | Mod: ,,, | Performed by: INTERNAL MEDICINE

## 2022-12-05 PROCEDURE — 63600175 PHARM REV CODE 636 W HCPCS: Performed by: INTERNAL MEDICINE

## 2022-12-05 PROCEDURE — 25000003 PHARM REV CODE 250

## 2022-12-05 PROCEDURE — 93010 ELECTROCARDIOGRAM REPORT: CPT | Mod: ,,, | Performed by: INTERNAL MEDICINE

## 2022-12-05 PROCEDURE — 99232 SBSQ HOSP IP/OBS MODERATE 35: CPT | Mod: ,,, | Performed by: INTERNAL MEDICINE

## 2022-12-05 PROCEDURE — 99239 PR HOSPITAL DISCHARGE DAY,>30 MIN: ICD-10-PCS | Mod: ,,, | Performed by: HOSPITALIST

## 2022-12-05 PROCEDURE — 93458 PR CATH PLACE/CORON ANGIO, IMG SUPER/INTERP,W LEFT HEART VENTRICULOGRAPHY: ICD-10-PCS | Mod: 26,,, | Performed by: INTERNAL MEDICINE

## 2022-12-05 PROCEDURE — 25000003 PHARM REV CODE 250: Performed by: INTERNAL MEDICINE

## 2022-12-05 PROCEDURE — 25000003 PHARM REV CODE 250: Performed by: STUDENT IN AN ORGANIZED HEALTH CARE EDUCATION/TRAINING PROGRAM

## 2022-12-05 PROCEDURE — 36415 COLL VENOUS BLD VENIPUNCTURE: CPT

## 2022-12-05 PROCEDURE — 25500020 PHARM REV CODE 255: Performed by: INTERNAL MEDICINE

## 2022-12-05 PROCEDURE — 99152 MOD SED SAME PHYS/QHP 5/>YRS: CPT | Performed by: INTERNAL MEDICINE

## 2022-12-05 PROCEDURE — 93005 ELECTROCARDIOGRAM TRACING: CPT

## 2022-12-05 PROCEDURE — 84100 ASSAY OF PHOSPHORUS: CPT

## 2022-12-05 PROCEDURE — C1769 GUIDE WIRE: HCPCS | Performed by: INTERNAL MEDICINE

## 2022-12-05 PROCEDURE — 86850 RBC ANTIBODY SCREEN: CPT | Performed by: HOSPITALIST

## 2022-12-05 PROCEDURE — 99152 PR MOD CONSCIOUS SEDATION, SAME PHYS, 5+ YRS, FIRST 15 MIN: ICD-10-PCS | Mod: ,,, | Performed by: INTERNAL MEDICINE

## 2022-12-05 PROCEDURE — C1887 CATHETER, GUIDING: HCPCS | Performed by: INTERNAL MEDICINE

## 2022-12-05 PROCEDURE — 36415 COLL VENOUS BLD VENIPUNCTURE: CPT | Performed by: HOSPITALIST

## 2022-12-05 PROCEDURE — 99152 MOD SED SAME PHYS/QHP 5/>YRS: CPT | Mod: ,,, | Performed by: INTERNAL MEDICINE

## 2022-12-05 PROCEDURE — 80053 COMPREHEN METABOLIC PANEL: CPT

## 2022-12-05 PROCEDURE — 99153 MOD SED SAME PHYS/QHP EA: CPT | Performed by: INTERNAL MEDICINE

## 2022-12-05 PROCEDURE — 85025 COMPLETE CBC W/AUTO DIFF WBC: CPT

## 2022-12-05 PROCEDURE — 85730 THROMBOPLASTIN TIME PARTIAL: CPT | Mod: 91 | Performed by: STUDENT IN AN ORGANIZED HEALTH CARE EDUCATION/TRAINING PROGRAM

## 2022-12-05 PROCEDURE — 85730 THROMBOPLASTIN TIME PARTIAL: CPT | Performed by: HOSPITALIST

## 2022-12-05 PROCEDURE — 83735 ASSAY OF MAGNESIUM: CPT

## 2022-12-05 PROCEDURE — 84484 ASSAY OF TROPONIN QUANT: CPT

## 2022-12-05 PROCEDURE — C1894 INTRO/SHEATH, NON-LASER: HCPCS | Performed by: INTERNAL MEDICINE

## 2022-12-05 PROCEDURE — 93010 EKG 12-LEAD: ICD-10-PCS | Mod: ,,, | Performed by: INTERNAL MEDICINE

## 2022-12-05 PROCEDURE — 99239 HOSP IP/OBS DSCHRG MGMT >30: CPT | Mod: ,,, | Performed by: HOSPITALIST

## 2022-12-05 PROCEDURE — 93458 L HRT ARTERY/VENTRICLE ANGIO: CPT | Performed by: INTERNAL MEDICINE

## 2022-12-05 RX ORDER — FENTANYL CITRATE 50 UG/ML
INJECTION, SOLUTION INTRAMUSCULAR; INTRAVENOUS
Status: DISCONTINUED | OUTPATIENT
Start: 2022-12-05 | End: 2022-12-05 | Stop reason: HOSPADM

## 2022-12-05 RX ORDER — LIDOCAINE HYDROCHLORIDE 10 MG/ML
INJECTION INFILTRATION; PERINEURAL
Status: DISCONTINUED | OUTPATIENT
Start: 2022-12-05 | End: 2022-12-05 | Stop reason: HOSPADM

## 2022-12-05 RX ORDER — NAPROXEN SODIUM 220 MG/1
81 TABLET, FILM COATED ORAL DAILY
Status: DISCONTINUED | OUTPATIENT
Start: 2022-12-06 | End: 2022-12-05 | Stop reason: HOSPADM

## 2022-12-05 RX ORDER — HEPARIN SODIUM 1000 [USP'U]/ML
INJECTION, SOLUTION INTRAVENOUS; SUBCUTANEOUS
Status: DISCONTINUED | OUTPATIENT
Start: 2022-12-05 | End: 2022-12-05 | Stop reason: HOSPADM

## 2022-12-05 RX ORDER — ACETAMINOPHEN 325 MG/1
650 TABLET ORAL EVERY 4 HOURS PRN
Status: DISCONTINUED | OUTPATIENT
Start: 2022-12-05 | End: 2022-12-05 | Stop reason: HOSPADM

## 2022-12-05 RX ORDER — ONDANSETRON 8 MG/1
8 TABLET, ORALLY DISINTEGRATING ORAL EVERY 8 HOURS PRN
Status: DISCONTINUED | OUTPATIENT
Start: 2022-12-05 | End: 2022-12-05 | Stop reason: HOSPADM

## 2022-12-05 RX ORDER — SODIUM CHLORIDE 9 MG/ML
INJECTION, SOLUTION INTRAVENOUS CONTINUOUS
Status: DISCONTINUED | OUTPATIENT
Start: 2022-12-05 | End: 2022-12-05 | Stop reason: HOSPADM

## 2022-12-05 RX ORDER — ATORVASTATIN CALCIUM 80 MG/1
80 TABLET, FILM COATED ORAL DAILY
Qty: 30 TABLET | Refills: 0 | Status: SHIPPED | OUTPATIENT
Start: 2022-12-06 | End: 2023-01-03

## 2022-12-05 RX ORDER — MIDAZOLAM HYDROCHLORIDE 1 MG/ML
INJECTION, SOLUTION INTRAMUSCULAR; INTRAVENOUS
Status: DISCONTINUED | OUTPATIENT
Start: 2022-12-05 | End: 2022-12-05 | Stop reason: HOSPADM

## 2022-12-05 RX ORDER — NAPROXEN SODIUM 220 MG/1
81 TABLET, FILM COATED ORAL DAILY
Status: DISCONTINUED | OUTPATIENT
Start: 2022-12-05 | End: 2022-12-05

## 2022-12-05 RX ORDER — NAPROXEN SODIUM 220 MG/1
81 TABLET, FILM COATED ORAL DAILY
Qty: 30 TABLET | Refills: 0 | Status: SHIPPED | OUTPATIENT
Start: 2022-12-06 | End: 2023-01-03 | Stop reason: SDUPTHER

## 2022-12-05 RX ORDER — LOSARTAN POTASSIUM 25 MG/1
12.5 TABLET ORAL DAILY
Qty: 15 TABLET | Refills: 0 | Status: SHIPPED | OUTPATIENT
Start: 2022-12-06 | End: 2023-01-03 | Stop reason: SDUPTHER

## 2022-12-05 RX ORDER — DIPHENHYDRAMINE HCL 50 MG
50 CAPSULE ORAL ONCE
Status: COMPLETED | OUTPATIENT
Start: 2022-12-05 | End: 2022-12-05

## 2022-12-05 RX ORDER — HEPARIN SOD,PORCINE/0.9 % NACL 1000/500ML
INTRAVENOUS SOLUTION INTRAVENOUS
Status: DISCONTINUED | OUTPATIENT
Start: 2022-12-05 | End: 2022-12-05 | Stop reason: HOSPADM

## 2022-12-05 RX ADMIN — ASPIRIN 81 MG: 81 TABLET, CHEWABLE ORAL at 09:12

## 2022-12-05 RX ADMIN — BUPROPION HYDROCHLORIDE 150 MG: 150 TABLET, FILM COATED, EXTENDED RELEASE ORAL at 09:12

## 2022-12-05 RX ADMIN — LOSARTAN POTASSIUM 12.5 MG: 25 TABLET, FILM COATED ORAL at 09:12

## 2022-12-05 RX ADMIN — METOPROLOL TARTRATE 25 MG: 25 TABLET, FILM COATED ORAL at 06:12

## 2022-12-05 RX ADMIN — SODIUM CHLORIDE: 0.9 INJECTION, SOLUTION INTRAVENOUS at 11:12

## 2022-12-05 RX ADMIN — BUSPIRONE HYDROCHLORIDE 15 MG: 5 TABLET ORAL at 09:12

## 2022-12-05 RX ADMIN — DIPHENHYDRAMINE HYDROCHLORIDE 50 MG: 50 CAPSULE ORAL at 09:12

## 2022-12-05 RX ADMIN — TICAGRELOR 90 MG: 90 TABLET ORAL at 09:12

## 2022-12-05 RX ADMIN — ATORVASTATIN CALCIUM 80 MG: 20 TABLET, FILM COATED ORAL at 09:12

## 2022-12-05 NOTE — PLAN OF CARE
Problem: Adult Inpatient Plan of Care  Goal: Plan of Care Review  Outcome: Ongoing, Progressing     Problem: Adult Inpatient Plan of Care  Goal: Patient-Specific Goal (Individualized)  Outcome: Ongoing, Progressing     Problem: Adult Inpatient Plan of Care  Goal: Absence of Hospital-Acquired Illness or Injury  Outcome: Ongoing, Progressing     Problem: Adult Inpatient Plan of Care  Goal: Optimal Comfort and Wellbeing  Outcome: Ongoing, Progressing     Problem: Adult Inpatient Plan of Care  Goal: Readiness for Transition of Care  Outcome: Ongoing, Progressing     Problem: Fall Injury Risk  Goal: Absence of Fall and Fall-Related Injury  Outcome: Ongoing, Progressing

## 2022-12-05 NOTE — CONSULTS
"[facility] - Cardiology Stepdown  Interventional Cardiology  Consult Note    Patient Name: Megan Mccall  MRN: [id number]  Admission Date: 12/4/2022  Hospital Length of Stay: 1 days  Code Status: Full Code   Attending Provider: Tunde Mcghee MD  Consulting Provider: Juan Knowles MD  Primary Care Physician: [facility]  Principal Problem:NSTEMI (non-ST elevated myocardial infarction)    Patient information was obtained from patient, past medical records and ER records.     Inpatient consult to Interventional Cardiology  Consult performed by: Juan Knowles MD  Consult ordered by: Anselmo Borrero MD        Subjective:     Chief Complaint:       HPI:  Ms. Megan Mccall is a 61 yo F with pmhx s/f Depression, anxiety, and tobacco abuse. She presents today with symptoms of chest discomfort x 2 days. Symptoms started Thursday and described as a feeling of uncomfortableness and left sided chest ache. Pt reporting symptoms were persistent but alleviated when she was "distracted." She denies any exertional, pleuritic, or reproducible components. Symtoms appreciated Th and Fri. On Saturday, she was busy at work and felt fine. She came home and slept and woke up with moderate-severe squeezing chest discomfort associated with a heaviness in her left arm lasting about an hour. No other associated symptoms. Denied diaphoresis, nausea/vomiting, palpitations, presyncope, light headedness. Of note, she rolls own cig. And smokes about 15 cig/day x 30 years. Her father diet of an MI at the age of 59.  Trop peaked at 3.7  TTE pending      Past Medical History:   Diagnosis Date    Anxiety     Depression        Past Surgical History:   Procedure Laterality Date    NONE         Review of patient's allergies indicates:  No Known Allergies    PTA Medications   Medication Sig    busPIRone (BUSPAR) 15 MG tablet Take 15 mg by mouth 3 (three) times daily.    buPROPion (WELLBUTRIN SR) 150 MG TBSR 12 hr tablet Take " 150 mg by mouth 2 (two) times daily.    cetirizine (ZYRTEC) 10 MG tablet Take 1 tablet (10 mg total) by mouth once daily. (Patient not taking: Reported on 10/20/2022)     Family History       Problem Relation (Age of Onset)    Diabetes Paternal Grandmother    Heart attack Father (59)    Heart disease Father          Tobacco Use    Smoking status: Every Day     Packs/day: 0.67     Years: 23.00     Pack years: 15.41     Types: Cigarettes     Start date: 8/25/1992    Smokeless tobacco: Never   Substance and Sexual Activity    Alcohol use: Yes     Alcohol/week: 18.0 standard drinks     Types: 18 Cans of beer per week     Comment: daily    Drug use: No    Sexual activity: Not on file     Review of Systems   Constitutional: Negative for diaphoresis, fever and night sweats.   HENT: Negative.     Eyes: Negative.    Cardiovascular:  Positive for dyspnea on exertion. Negative for chest pain, irregular heartbeat, near-syncope, orthopnea, palpitations and syncope.   Respiratory:  Negative for hemoptysis, shortness of breath and sputum production.    Skin:  Negative for rash.   Gastrointestinal:  Negative for abdominal pain, change in bowel habit, hematemesis, hematochezia, melena, nausea and vomiting.   Objective:     Vital Signs (Most Recent):  Temp: 96.9 °F (36.1 °C) (12/05/22 0756)  Pulse: 66 (12/05/22 0756)  Resp: 17 (12/05/22 0756)  BP: (!) 150/89 (12/05/22 0756)  SpO2: (!) 94 % (12/05/22 0756)   Vital Signs (24h Range):  Temp:  [96.9 °F (36.1 °C)-98.2 °F (36.8 °C)] 96.9 °F (36.1 °C)  Pulse:  [61-86] 66  Resp:  [16-18] 17  SpO2:  [94 %-97 %] 94 %  BP: (115-168)/(56-89) 150/89     Weight: 48.6 kg (107 lb 2.3 oz)  Body mass index is 18.39 kg/m².    SpO2: (!) 94 %  O2 Device (Oxygen Therapy): room air      Intake/Output Summary (Last 24 hours) at 12/5/2022 0914  Last data filed at 12/4/2022 1840  Gross per 24 hour   Intake 402 ml   Output 820 ml   Net -418 ml       Lines/Drains/Airways       Peripheral Intravenous Line   Duration                  Peripheral IV - Single Lumen 12/04/22 0241 20 G Right Antecubital 1 day         Peripheral IV - Single Lumen 12/04/22 0530 20 G Left Forearm 1 day                    Physical Exam  Vitals reviewed.   Constitutional:       Appearance: Normal appearance. She is not ill-appearing.   HENT:      Head: Normocephalic and atraumatic.   Eyes:      General: No scleral icterus.     Conjunctiva/sclera: Conjunctivae normal.   Cardiovascular:      Rate and Rhythm: Normal rate and regular rhythm.   Pulmonary:      Effort: Pulmonary effort is normal.   Abdominal:      General: There is no distension.   Musculoskeletal:         General: No swelling.      Right lower leg: No edema.      Left lower leg: No edema.   Skin:     General: Skin is warm.   Neurological:      Mental Status: She is alert.   Psychiatric:         Mood and Affect: Mood normal.       Significant Labs: All pertinent lab results from the last 24 hours have been reviewed.    Significant Imaging: Echocardiogram: Transthoracic echo (TTE) complete (Cupid Only): No results found for this or any previous visit.    Assessment and Plan:     * NSTEMI (non-ST elevated myocardial infarction)  Pt is a 62 y.o female with h/o tobacco abuse who presents to the hospital with cp. Trop peaked at 3.7. ekg with no st-t changes.     TTE pending    --LHC +/- PCI, patient is a MARÍA candidate  - Anti-platelet Therapy: ASA / Ticagrelor  - Access: Right radial  - Catheters: J Carlos  - Creatinine/CrCl: 0.8  - Allergies: No shellfish / Iodine allergy  - Pre-Hydration: NS  - Pre-Op Med: Bendaryl 50mg pO   - All patient's questions were answered.  -The risks, benefits and alternatives of the procedure were explained to the patient.   -The risks of coronary angiography include but are not limited to: bleeding, infection, heart rhythm abnormalities, allergic reactions, kidney injury and potential need for dialysis, stroke and death.   - Should stenting be indicated, the  patient has agreed to dual anti-platelet therapy for 1-consecutive year with a drug-eluting stent and a minimum of 1-month with the use of a bare metal stent  - Additionally, pt is aware that non-compliance is likely to result in stent clotting with heart attack, heart failure, and/or death  -The risks of moderate sedation include hypotension, respiratory depression, arrhythmias, bronchospasm, and death.   - Informed consent was obtained and the  patient is agreeable to proceed with the procedure.          VTE Risk Mitigation (From admission, onward)         Ordered     heparin 25,000 units in dextrose 5% (100 units/ml) IV bolus from bag - ADDITIONAL PRN BOLUS - 60 units/kg (max bolus 4000 units)  As needed (PRN)        Question:  Heparin Infusion Adjustment (DO NOT MODIFY ANSWER)  Answer:  [email or web address]\epic\Images\Pharmacy\HeparinInfusions\heparin LOW INTENSITY nomogram for [facility] [id number].pdf    12/04/22 0901     heparin 25,000 units in dextrose 5% (100 units/ml) IV bolus from bag - ADDITIONAL PRN BOLUS - 30 units/kg (max bolus 4000 units)  As needed (PRN)        Question:  Heparin Infusion Adjustment (DO NOT MODIFY ANSWER)  Answer:  [email or web address]\epic\Images\Pharmacy\HeparinInfusions\heparin LOW INTENSITY nomogram for [facility] [id number].pdf    12/04/22 0901     heparin 25,000 units in dextrose 5% 250 mL (100 units/mL) infusion LOW INTENSITY nomogram - [facility]  Continuous        Question Answer Comment   Heparin Infusion Adjustment (DO NOT MODIFY ANSWER) [email or web address]\epic\Images\Pharmacy\HeparinInfusions\heparin LOW INTENSITY nomogram for [facility] [id number].pdf    Begin at (in units/kg/hr) 12        12/04/22 0901     IP VTE LOW RISK PATIENT  Once         12/04/22 2075                Thank you for your consult. I will follow-up with patient. Please contact us if you have any additional questions.    Juan Knowles MD  Interventional Cardiology   [facility]

## 2022-12-05 NOTE — TELEPHONE ENCOUNTER
"Information packet sent to patient, which includes "Your guide to living with heart disease". Letter was also sent to patient.      Uriel Aguilar RN  Cardiac Rehab Nurse  "

## 2022-12-05 NOTE — PLAN OF CARE
Brief Operative Note:    : Gerardo Reynolds Jr., MD     Referring Physician: Self,Aaareferral     All Operators: Surgeon(s):  MD Juan Rivero Jr., MD     Preoperative Diagnosis: NSTEMI (non-ST elevated myocardial infarction) [I21.4]     Postop Diagnosis: NSTEMI (non-ST elevated myocardial infarction) [I21.4]    Treatments/Procedures: Procedure(s) (LRB):  Angiogram, Coronary, with Left Heart Cath (N/A)  Ventriculogram, Left    Access: Right radial artery    Findings: No evidence of obstructive CAD    Ventriculography consistent with Takotsubo    See catheterization report for full details.    Intervention: None    See catheterization report for full details.    Closure device:  Radial band        Plan:  - Post cath protocol   - IVF @ 200 cc/hr x 5 hours  - Bed rest x 2 hours   - Continue aspirin 81 mg daily indefinitely  - Continue high intensity statin therapy (LDL goal < 70)  - Risk factor reduction (BP <130/80 mmHg, glycemic control, etc)  - Cardiac rehab referral  - Follow up with outpatient cardiologist    Estimated Blood loss: 20 cc    Specimens removed: No    Juan Knowles MD  Interventional Cardiology PGY-4  12/05/2022

## 2022-12-05 NOTE — SUBJECTIVE & OBJECTIVE
Past Medical History:   Diagnosis Date    Anxiety     Depression        Past Surgical History:   Procedure Laterality Date    NONE         Review of patient's allergies indicates:  No Known Allergies    PTA Medications   Medication Sig    busPIRone (BUSPAR) 15 MG tablet Take 15 mg by mouth 3 (three) times daily.    buPROPion (WELLBUTRIN SR) 150 MG TBSR 12 hr tablet Take 150 mg by mouth 2 (two) times daily.    cetirizine (ZYRTEC) 10 MG tablet Take 1 tablet (10 mg total) by mouth once daily. (Patient not taking: Reported on 10/20/2022)     Family History       Problem Relation (Age of Onset)    Diabetes Paternal Grandmother    Heart attack Father (59)    Heart disease Father          Tobacco Use    Smoking status: Every Day     Packs/day: 0.67     Years: 23.00     Pack years: 15.41     Types: Cigarettes     Start date: 8/25/1992    Smokeless tobacco: Never   Substance and Sexual Activity    Alcohol use: Yes     Alcohol/week: 18.0 standard drinks     Types: 18 Cans of beer per week     Comment: daily    Drug use: No    Sexual activity: Not on file     Review of Systems   Constitutional: Negative for diaphoresis, fever and night sweats.   HENT: Negative.     Eyes: Negative.    Cardiovascular:  Positive for dyspnea on exertion. Negative for chest pain, irregular heartbeat, near-syncope, orthopnea, palpitations and syncope.   Respiratory:  Negative for hemoptysis, shortness of breath and sputum production.    Skin:  Negative for rash.   Gastrointestinal:  Negative for abdominal pain, change in bowel habit, hematemesis, hematochezia, melena, nausea and vomiting.   Objective:     Vital Signs (Most Recent):  Temp: 96.9 °F (36.1 °C) (12/05/22 0756)  Pulse: 66 (12/05/22 0756)  Resp: 17 (12/05/22 0756)  BP: (!) 150/89 (12/05/22 0756)  SpO2: (!) 94 % (12/05/22 0756)   Vital Signs (24h Range):  Temp:  [96.9 °F (36.1 °C)-98.2 °F (36.8 °C)] 96.9 °F (36.1 °C)  Pulse:  [61-86] 66  Resp:  [16-18] 17  SpO2:  [94 %-97 %] 94 %  BP:  (115-168)/(56-89) 150/89     Weight: 48.6 kg (107 lb 2.3 oz)  Body mass index is 18.39 kg/m².    SpO2: (!) 94 %  O2 Device (Oxygen Therapy): room air      Intake/Output Summary (Last 24 hours) at 12/5/2022 0914  Last data filed at 12/4/2022 1840  Gross per 24 hour   Intake 402 ml   Output 820 ml   Net -418 ml       Lines/Drains/Airways       Peripheral Intravenous Line  Duration                  Peripheral IV - Single Lumen 12/04/22 0241 20 G Right Antecubital 1 day         Peripheral IV - Single Lumen 12/04/22 0530 20 G Left Forearm 1 day                    Physical Exam  Vitals reviewed.   Constitutional:       Appearance: Normal appearance. She is not ill-appearing.   HENT:      Head: Normocephalic and atraumatic.   Eyes:      General: No scleral icterus.     Conjunctiva/sclera: Conjunctivae normal.   Cardiovascular:      Rate and Rhythm: Normal rate and regular rhythm.   Pulmonary:      Effort: Pulmonary effort is normal.   Abdominal:      General: There is no distension.   Musculoskeletal:         General: No swelling.      Right lower leg: No edema.      Left lower leg: No edema.   Skin:     General: Skin is warm.   Neurological:      Mental Status: She is alert.   Psychiatric:         Mood and Affect: Mood normal.       Significant Labs: All pertinent lab results from the last 24 hours have been reviewed.    Significant Imaging: Echocardiogram: Transthoracic echo (TTE) complete (Cupid Only): No results found for this or any previous visit.

## 2022-12-05 NOTE — HPI
"Ms. Megan Mccall is a 61 yo F with pmhx s/f Depression, anxiety, and tobacco abuse. She presents today with symptoms of chest discomfort x 2 days. Symptoms started Thursday and described as a feeling of uncomfortableness and left sided chest ache. Pt reporting symptoms were persistent but alleviated when she was "distracted." She denies any exertional, pleuritic, or reproducible components. Symtoms appreciated Th and Fri. On Saturday, she was busy at work and felt fine. She came home and slept and woke up with moderate-severe squeezing chest discomfort associated with a heaviness in her left arm lasting about an hour. No other associated symptoms. Denied diaphoresis, nausea/vomiting, palpitations, presyncope, light headedness. Of note, she rolls own cig. And smokes about 15 cig/day x 30 years. Her father diet of an MI at the age of 59.  Trop peaked at 3.7  TTE pending  "

## 2022-12-05 NOTE — SUBJECTIVE & OBJECTIVE
Interval History: Around 5pm yesterday trops increased to 3.799. Repeat at 9pm showed decrease to 2.4. Patient has remained chest pain free, and is on ACS protocol. IC will take for [facility] today. Was made NPO at midnight in anticipation.     Review of Systems   Constitutional:  Negative for chills, diaphoresis and fever.   HENT:  Negative for rhinorrhea and sore throat.    Eyes:  Negative for visual disturbance.   Respiratory:  Negative for cough and shortness of breath.    Cardiovascular:  Negative for chest pain and leg swelling.   Gastrointestinal:  Negative for abdominal pain, constipation, diarrhea, nausea and vomiting.   Genitourinary:  Negative for dysuria.   Musculoskeletal:  Negative for arthralgias and myalgias.   Skin:  Negative for rash.   Neurological:  Negative for light-headedness.   Psychiatric/Behavioral:  Negative for agitation and confusion.    Objective:     Vital Signs (Most Recent):  Temp: 96.9 °F (36.1 °C) (12/05/22 0756)  Pulse: 66 (12/05/22 0756)  Resp: 17 (12/05/22 0756)  BP: (!) 150/89 (12/05/22 0756)  SpO2: (!) 94 % (12/05/22 0756)   Vital Signs (24h Range):  Temp:  [96.9 °F (36.1 °C)-98.2 °F (36.8 °C)] 96.9 °F (36.1 °C)  Pulse:  [61-86] 66  Resp:  [16-18] 17  SpO2:  [94 %-97 %] 94 %  BP: (115-168)/(56-89) 150/89     Weight: 48.6 kg (107 lb 2.3 oz)  Body mass index is 18.39 kg/m².    Intake/Output Summary (Last 24 hours) at 12/5/2022 0920  Last data filed at 12/4/2022 1840  Gross per 24 hour   Intake 402 ml   Output 820 ml   Net -418 ml      Physical Exam  HENT:      Head: Normocephalic and atraumatic.      Right Ear: External ear normal.      Left Ear: External ear normal.      Nose: Nose normal.      Mouth/Throat:      Mouth: Mucous membranes are moist.      Pharynx: Oropharynx is clear.   Eyes:      General: No scleral icterus.     Extraocular Movements: Extraocular movements intact.      Conjunctiva/sclera: Conjunctivae normal.   Cardiovascular:      Rate and Rhythm: Normal rate and  regular rhythm.   Pulmonary:      Effort: Pulmonary effort is normal. No respiratory distress.      Breath sounds: Normal breath sounds. No wheezing or rales.   Abdominal:      General: Abdomen is flat. Bowel sounds are normal. There is no distension.      Palpations: Abdomen is soft.      Tenderness: There is no abdominal tenderness.   Musculoskeletal:         General: No swelling. Normal range of motion.      Cervical back: Normal range of motion.      Right lower leg: No edema.      Left lower leg: No edema.   Skin:     General: Skin is warm and dry.   Neurological:      General: No focal deficit present.      Mental Status: She is alert and oriented to person, place, and time. Mental status is at baseline.   Psychiatric:         Mood and Affect: Mood normal.         Behavior: Behavior normal.       Significant Labs: All pertinent labs within the past 24 hours have been reviewed.    Significant Imaging: I have reviewed all pertinent imaging results/findings within the past 24 hours.

## 2022-12-05 NOTE — SUBJECTIVE & OBJECTIVE
Interval History: cath planned for today    Review of Systems   Constitutional: Negative for diaphoresis, fever and night sweats.   HENT: Negative.     Eyes: Negative.    Cardiovascular:  Positive for dyspnea on exertion. Negative for chest pain, irregular heartbeat, near-syncope, orthopnea, palpitations and syncope.   Respiratory:  Negative for hemoptysis, shortness of breath and sputum production.    Skin:  Negative for rash.   Gastrointestinal:  Negative for abdominal pain, change in bowel habit, hematemesis, hematochezia, melena, nausea and vomiting.   Objective:     Vital Signs (Most Recent):  Temp: 96.9 °F (36.1 °C) (12/05/22 0756)  Pulse: 66 (12/05/22 0756)  Resp: 17 (12/05/22 0756)  BP: (!) 150/89 (12/05/22 0756)  SpO2: (!) 94 % (12/05/22 0756)   Vital Signs (24h Range):  Temp:  [96.9 °F (36.1 °C)-98.2 °F (36.8 °C)] 96.9 °F (36.1 °C)  Pulse:  [61-86] 66  Resp:  [16-18] 17  SpO2:  [94 %-97 %] 94 %  BP: (115-155)/(56-89) 150/89     Weight: 48.5 kg (107 lb)  Body mass index is 18.37 kg/m².     SpO2: (!) 94 %  O2 Device (Oxygen Therapy): nasal cannula      Intake/Output Summary (Last 24 hours) at 12/5/2022 1728  Last data filed at 12/4/2022 1840  Gross per 24 hour   Intake 222 ml   Output 320 ml   Net -98 ml       Lines/Drains/Airways       Peripheral Intravenous Line  Duration                  Peripheral IV - Single Lumen 12/04/22 0241 20 G Right Antecubital 1 day         Peripheral IV - Single Lumen 12/04/22 0530 20 G Left Forearm 1 day                    Physical Exam  Vitals reviewed.   Constitutional:       Appearance: Normal appearance. She is not ill-appearing.   HENT:      Head: Normocephalic and atraumatic.   Eyes:      General: No scleral icterus.     Conjunctiva/sclera: Conjunctivae normal.   Cardiovascular:      Rate and Rhythm: Normal rate and regular rhythm.   Pulmonary:      Effort: Pulmonary effort is normal.   Abdominal:      General: There is no distension.   Musculoskeletal:         General:  No swelling.      Right lower leg: No edema.      Left lower leg: No edema.   Skin:     General: Skin is warm.   Neurological:      Mental Status: She is alert.   Psychiatric:         Mood and Affect: Mood normal.       Significant Labs: CMP   Recent Labs   Lab 12/04/22  0241 12/05/22  0541    141   K 4.1 4.1    108   CO2 27 27    97   BUN 12 11   CREATININE 0.8 0.8   CALCIUM 9.8 9.0   PROT 7.7 6.2   ALBUMIN 4.5 3.6   BILITOT 0.3 0.7   ALKPHOS 96 75   AST 24 18   ALT 22 17   ANIONGAP 10 6*   , CBC   Recent Labs   Lab 12/04/22  0241 12/04/22  0907 12/05/22  0541   WBC 9.22 5.73 5.84   HGB 14.1 13.3 13.0   HCT 44.2 42.1 40.7    233 218   , and Troponin   Recent Labs   Lab 12/04/22  0530 12/04/22  1542 12/05/22  0014   TROPONINI 1.860* 3.799* 2.431*       Significant Imaging: Cardiac Cath: non-obstructive CAD

## 2022-12-05 NOTE — PROGRESS NOTES
[facility] Medicine  Progress Note    Patient Name: Megan Mccall  MRN: [id number]  Patient Class: IP- Inpatient   Admission Date: 12/4/2022  Length of Stay: 1 days  Attending Physician: Tunde Mcghee MD  Primary Care Provider: [facility]        Subjective:     Principal Problem:NSTEMI (non-ST elevated myocardial infarction)        HPI:  Ms Mccall is a 61 yo F with PMH depression, anxiety, tobacco use who presents with 2 days of chest pain. She reports that it has felt like a dull ache with some pressure and has been intermittent. 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 She has had no nausea, vomiting, or dizziness. She reports an associated mild headache.     Patient reports that she rolls her own cigarettes and smokes the equivalent of 15 cigarettes per day. She also drinks 2-3 beers per day. 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    In the ED, HR 93, /99, O2 97 on room air. Labs notable for no leukocytosis or anemia, troponin 2.4, 1.8 on repeat. EKG without ST changes. Given ASA and brilinta load, SL nitro. Cardiology consulted and recommend initiating ACS protocol. Admitted for further management.       Overview/Hospital Course:  Patient admitted for NSTEMI.  Per cardiology recommendation started on heparin gtt, losartan, metoprolol, atorvastatin, and DAPT with aspirin and ticagrelor. Trops upon admission were roughly 2.4 which then decreased. Repeat however later peaked at 3.799, though she has been on ASC protocol  and denies chest pain. SOB, or any new or developing symptoms. Patient will be NPO in anticipation of [facility]       Interval History: Around 5pm yesterday trops increased to 3.799. Repeat at 9pm showed decrease to 2.4. Patient has remained chest pain free, and is on ACS protocol. IC will take for [facility] today. Was made NPO at midnight in anticipation.      Review of Systems   Constitutional:  Negative for chills, diaphoresis and fever.   HENT:  Negative for rhinorrhea and sore throat.    Eyes:  Negative for visual disturbance.   Respiratory:  Negative for cough and shortness of breath.    Cardiovascular:  Negative for chest pain and leg swelling.   Gastrointestinal:  Negative for abdominal pain, constipation, diarrhea, nausea and vomiting.   Genitourinary:  Negative for dysuria.   Musculoskeletal:  Negative for arthralgias and myalgias.   Skin:  Negative for rash.   Neurological:  Negative for light-headedness.   Psychiatric/Behavioral:  Negative for agitation and confusion.    Objective:     Vital Signs (Most Recent):  Temp: 96.9 °F (36.1 °C) (12/05/22 0756)  Pulse: 66 (12/05/22 0756)  Resp: 17 (12/05/22 0756)  BP: (!) 150/89 (12/05/22 0756)  SpO2: (!) 94 % (12/05/22 0756)   Vital Signs (24h Range):  Temp:  [96.9 °F (36.1 °C)-98.2 °F (36.8 °C)] 96.9 °F (36.1 °C)  Pulse:  [61-86] 66  Resp:  [16-18] 17  SpO2:  [94 %-97 %] 94 %  BP: (115-168)/(56-89) 150/89     Weight: 48.6 kg (107 lb 2.3 oz)  Body mass index is 18.39 kg/m².    Intake/Output Summary (Last 24 hours) at 12/5/2022 0920  Last data filed at 12/4/2022 1840  Gross per 24 hour   Intake 402 ml   Output 820 ml   Net -418 ml      Physical Exam  HENT:      Head: Normocephalic and atraumatic.      Right Ear: External ear normal.      Left Ear: External ear normal.      Nose: Nose normal.      Mouth/Throat:      Mouth: Mucous membranes are moist.      Pharynx: Oropharynx is clear.   Eyes:      General: No scleral icterus.     Extraocular Movements: Extraocular movements intact.      Conjunctiva/sclera: Conjunctivae normal.   Cardiovascular:      Rate and Rhythm: Normal rate and regular rhythm.   Pulmonary:      Effort: Pulmonary effort is normal. No respiratory distress.      Breath sounds: Normal breath sounds. No wheezing or rales.   Abdominal:      General: Abdomen is flat. Bowel sounds are normal. There is no  distension.      Palpations: Abdomen is soft.      Tenderness: There is no abdominal tenderness.   Musculoskeletal:         General: No swelling. Normal range of motion.      Cervical back: Normal range of motion.      Right lower leg: No edema.      Left lower leg: No edema.   Skin:     General: Skin is warm and dry.   Neurological:      General: No focal deficit present.      Mental Status: She is alert and oriented to person, place, and time. Mental status is at baseline.   Psychiatric:         Mood and Affect: Mood normal.         Behavior: Behavior normal.       Significant Labs: All pertinent labs within the past 24 hours have been reviewed.    Significant Imaging: I have reviewed all pertinent imaging results/findings within the past 24 hours.      Assessment/Plan:      * NSTEMI (non-ST elevated myocardial infarction)  61 yo F with 2 days intermittent aching chest pain, extensive smoking history, alcohol use (2-3 beers/day). Physical exam is normal. Troponin initially 2.8, trending down. EKG without ST changes. ASA and brilinta loaded in ED. Cardiology consulted, recommend ACS protocol and interventional cards consult.    - NPO pending ICards eval  - TTE ordered  - trending trop x3 unless it rises, then continue to trend  - f/u lipid panel, tsh, HbA1c  - heparin gtt  - lopressor started per cards  - ASA and brilinta daily dosing to start 12/5 per cards  - atorvastatin 80mg per cards  - [facility] today, most recent trops 2.4       Tobacco use  Patient reports rolling her own cigarettes and smoking the equivalent of 15 cigarettes per day. Educated on the effects of smoking on heart health.      Anxiety and depression  Continue home bupropion and buspirone          VTE Risk Mitigation (From admission, onward)         Ordered     heparin (porcine) injection  As needed (PRN)         12/05/22 1008     heparin infusion 1,000 units/500 ml in 0.9% NaCl (on sterile field)  As needed (PRN)         12/05/22 0953     heparin  25,000 units in dextrose 5% (100 units/ml) IV bolus from bag - ADDITIONAL PRN BOLUS - 60 units/kg (max bolus 4000 units)  As needed (PRN)        Question:  Heparin Infusion Adjustment (DO NOT MODIFY ANSWER)  Answer:  [email or web address]\epic\Images\Pharmacy\HeparinInfusions\heparin LOW INTENSITY nomogram for [facility] [id number].pdf    12/04/22 0901     heparin 25,000 units in dextrose 5% (100 units/ml) IV bolus from bag - ADDITIONAL PRN BOLUS - 30 units/kg (max bolus 4000 units)  As needed (PRN)        Question:  Heparin Infusion Adjustment (DO NOT MODIFY ANSWER)  Answer:  \\[email or web address]\epic\Images\Pharmacy\HeparinInfusions\heparin LOW INTENSITY nomogram for [facility] [id number].pdf    12/04/22 0901     heparin 25,000 units in dextrose 5% 250 mL (100 units/mL) infusion LOW INTENSITY nomogram - [facility]  Continuous        Question Answer Comment   Heparin Infusion Adjustment (DO NOT MODIFY ANSWER) \\[email or web address]\epic\Images\Pharmacy\HeparinInfusions\heparin LOW INTENSITY nomogram for [facility] [id number].pdf    Begin at (in units/kg/hr) 12        12/04/22 0901     IP VTE LOW RISK PATIENT  Once         12/04/22 0457                Discharge Planning   ABDI:      Code Status: Full Code   Is the patient medically ready for discharge?: No    Reason for patient still in hospital (select all that apply): Patient trending condition, Treatment and Consult recommendations  Discharge Plan A: Home                  Ontario DO Davi  Department of Hospital Medicine   [facility]

## 2022-12-05 NOTE — LETTER
December 5, 2022    Megan M Stefany  [address] [address]             [facility]  [address]  [place] [address]  Phone: [phone]                                  [facility]   [address]   [place] [address]  [phone]         [facility]   [address] [place] [address] [phone]         [facility]    [address]  [place] [address] [phone]   Re: Megan QUEVEDO Stefany  Clinic number: [id number]    Dear Ms. Hewittner:    You were recently admitted to an [facility] facility for cardiac (heart) problem.  Your physician has referred you to [facility].  Cardiac Rehab Phase 2 is an educational and exercise program, conducted in a outpatient setting, proven to help reduce your risk for recurrent heart events.    Cardiac rehab has two major parts:    1. Exercise training to help you achieve cardiovascular fitness while learning how to exercise safely and improve muscle strength and endurance.  Your exercise prescription will be based on the results of the cardiopulmonary stress test (CPX) which will be done before entering the program and at completion.  2. Education, counseling and training to help you understand your heart condition and find ways to reduce your risk of future heart problems.  The cardiac rehab team will help you learn how to cope with the stress of adjusting to a new lifestyle and to deal with your fears about the future.    Phase 2 is a 36-session program, meeting 3 times a week for 12 weeks.  Each session consists of an hour of exercise and half-hour dedicated to the educational topic of the day.  Class days vary per location.  Please contact your nearest facility for details.    Through cardiac rehab you will learn:  About your heart condition, medical therapies, and medication  Risk factors in y our lifestyle contributing  to heart disease  New strategies to modify your risk factors  About a healthy diet that can lower your blood cholesterol, control weight, help prevent or control high blood pressure, and diabetes  How to stop smoking  How to manage stress    If you are interested in getting started, call the [facility] of your choosing.     Sincerely,     [facility]

## 2022-12-05 NOTE — PROGRESS NOTES
[facility] Step[facility]  Cardiology  Progress Note    Patient Name: Megan Mccall  MRN: [id number]  Admission Date: 12/4/2022  Hospital Length of Stay: 1 days  Code Status: Full Code   Attending Physician: Tunde Mcghee MD   Primary Care Physician: [facility]  Expected Discharge Date: 12/5/2022  Principal Problem:Takotsubo cardiomyopathy    Subjective:     Hospital Course:   No notes on file    Interval History: cath planned for today    Review of Systems   Constitutional: Negative for diaphoresis, fever and night sweats.   HENT: Negative.     Eyes: Negative.    Cardiovascular:  Positive for dyspnea on exertion. Negative for chest pain, irregular heartbeat, near-syncope, orthopnea, palpitations and syncope.   Respiratory:  Negative for hemoptysis, shortness of breath and sputum production.    Skin:  Negative for rash.   Gastrointestinal:  Negative for abdominal pain, change in bowel habit, hematemesis, hematochezia, melena, nausea and vomiting.   Objective:     Vital Signs (Most Recent):  Temp: 96.9 °F (36.1 °C) (12/05/22 0756)  Pulse: 66 (12/05/22 0756)  Resp: 17 (12/05/22 0756)  BP: (!) 150/89 (12/05/22 0756)  SpO2: (!) 94 % (12/05/22 0756)   Vital Signs (24h Range):  Temp:  [96.9 °F (36.1 °C)-98.2 °F (36.8 °C)] 96.9 °F (36.1 °C)  Pulse:  [61-86] 66  Resp:  [16-18] 17  SpO2:  [94 %-97 %] 94 %  BP: (115-155)/(56-89) 150/89     Weight: 48.5 kg (107 lb)  Body mass index is 18.37 kg/m².     SpO2: (!) 94 %  O2 Device (Oxygen Therapy): nasal cannula      Intake/Output Summary (Last 24 hours) at 12/5/2022 1728  Last data filed at 12/4/2022 1840  Gross per 24 hour   Intake 222 ml   Output 320 ml   Net -98 ml       Lines/Drains/Airways       Peripheral Intravenous Line  Duration                  Peripheral IV - Single Lumen 12/04/22 0241 20 G Right Antecubital 1 day         Peripheral IV - Single Lumen 12/04/22 0530 20 G Left Forearm 1 day                    Physical Exam  Vitals reviewed.  "  Constitutional:       Appearance: Normal appearance. She is not ill-appearing.   HENT:      Head: Normocephalic and atraumatic.   Eyes:      General: No scleral icterus.     Conjunctiva/sclera: Conjunctivae normal.   Cardiovascular:      Rate and Rhythm: Normal rate and regular rhythm.   Pulmonary:      Effort: Pulmonary effort is normal.   Abdominal:      General: There is no distension.   Musculoskeletal:         General: No swelling.      Right lower leg: No edema.      Left lower leg: No edema.   Skin:     General: Skin is warm.   Neurological:      Mental Status: She is alert.   Psychiatric:         Mood and Affect: Mood normal.       Significant Labs: CMP   Recent Labs   Lab 22  0241 22  0541    141   K 4.1 4.1    108   CO2 27 27    97   BUN 12 11   CREATININE 0.8 0.8   CALCIUM 9.8 9.0   PROT 7.7 6.2   ALBUMIN 4.5 3.6   BILITOT 0.3 0.7   ALKPHOS 96 75   AST 24 18   ALT 22 17   ANIONGAP 10 6*   , CBC   Recent Labs   Lab 22  0241 22  0907 22  0541   WBC 9.22 5.73 5.84   HGB 14.1 13.3 13.0   HCT 44.2 42.1 40.7    233 218   , and Troponin   Recent Labs   Lab 22  0530 22  1542 22  0014   TROPONINI 1.860* 3.799* 2.431*       Significant Imaging: Cardiac Cath: non-obstructive CAD    Assessment and Plan:       NSTEMI (non-ST elevated myocardial infarction)  61 yo F presenting with signs and symptoms of NSTEMI.  Risk factors include tobacco use, possible family hx (father  at age 59)   ECG shows non specific findings. Troponin elevated to 3.8 (peak)  Reports symptoms are significantly improved now but admits to the "mild ache" in her chest that has been persistent for the past 2 days.    Started on ACS protocol with heparin ggt. Loaded with asa 325 and brilinta 180 mg now followed by daily and bid dosing respectively.  No signs of heart failure on exam.  TTE on  shows EF of 55%    - Cath completed on : no obstructive CAD present, " small area of apical akinesis noted. Findings most consistent with partially resolved stress induced cardiomyopathy  - heparin and ticagrelor DC'd  - Ok for discharge.   - discussed smoking cessation          VTE Risk Mitigation (From admission, onward)         Ordered     heparin (porcine) injection  As needed (PRN)         12/05/22 1008     heparin infusion 1,000 units/500 ml in 0.9% NaCl (on sterile field)  As needed (PRN)         12/05/22 0953     IP VTE LOW RISK PATIENT  Once         12/04/22 2734                Sushil Low MD  Cardiology  [facility] - Card[facility] Step[facility]

## 2022-12-05 NOTE — PLAN OF CARE
Pt discharged home with no post-acute needs, transported by her long time friend, Golden Lynch.    Jazmín Palacios [facility]  Case Management Department  [email or web address]       12/05/22 1637   Final Note   Assessment Type Final Discharge Note   Anticipated Discharge Disposition Home   What phone number can be called within the next 1-3 days to see how you are doing after discharge? [phone]   Hospital Resources/Appts/Education Provided Provided patient/caregiver with written discharge plan information;Appointments scheduled and added to AVS   Post-Acute Status   Post-Acute Authorization Other   Other Status No Post-Acute Service Needs   Discharge Delays None known at this time

## 2022-12-05 NOTE — HOSPITAL COURSE
Patient admitted for NSTEMI.  Per cardiology recommendation started on heparin gtt, losartan, metoprolol, atorvastatin, and DAPT with aspirin and ticagrelor. Her BP while in patient has been substantially elevated over 200 SBP at one point, started on low dose losartan. Trops upon admission were roughly 2.4 which then decreased. Repeat however later peaked at 3.799, though she has been on ASC protocol  and denies chest pain. SOB, or any new or developing symptoms. Patient will be NPO in anticipation of [facility]. Results show  patent arteries, but indications for takotsubo cardiomyopathy. Will D/C DAPT and heparin. Cardiology recommends  3 week excuse from work. Will also send referrals for outpatient cards, primary care, and cardiac rehab. She will leave on ASA, lipitor, and losartan.

## 2022-12-05 NOTE — ASSESSMENT & PLAN NOTE
"61 yo F presenting with signs and symptoms of NSTEMI.  Risk factors include tobacco use, possible family hx (father  at age 59)   ECG shows non specific findings. Troponin elevated to 2.8.  Reports symptoms are significantly improved now but admits to the "mild ache" in her chest that has been persistent for the past 2 days.  Continue to trend trop q6h x 3 evaluations. Continue to trend if uprising  Order formal echo  I will placed interventional cardiology consult to be evaluated in am  Start on ACS protocol with heparin ggt. Load with asa 325 and brilinta 180 mg now followed by daily and bid dosing respectively.  Obtain tsh, lipid panel, and A1c level with am labs  No signs of heart failure on exam. Start on metoprolol tartrate 25 mg po bid and atorvastatin 80 mg daily  Cardiology will continue to follow. If squeezing chest discomfort recurs, obtain stat ecg, trop and treat with slNTG. Notify cardiology if symptoms do not resolve.      "
"63 yo F presenting with signs and symptoms of NSTEMI.  Risk factors include tobacco use, possible family hx (father  at age 59)   ECG shows non specific findings. Troponin elevated to 3.8 (peak)  Reports symptoms are significantly improved now but admits to the "mild ache" in her chest that has been persistent for the past 2 days.    Started on ACS protocol with heparin ggt. Loaded with asa 325 and brilinta 180 mg now followed by daily and bid dosing respectively.  No signs of heart failure on exam.  TTE on  shows EF of 55%    - Cath completed on : no obstructive CAD present, small area of apical akinesis noted. Findings most consistent with partially resolved stress induced cardiomyopathy  - heparin and ticagrelor DC'd  - Ok for discharge.   - discussed smoking cessation    "
"63 yo F with 2 days intermittent aching chest pain, extensive smoking history, alcohol use (2-3 beers/day). Physical exam is normal. Troponin initially 2.8, trending down. EKG without ST changes. ASA and brilinta loaded in ED. Cardiology consulted, recommend ACS protocol and interventional cards consult.    - NPO pending ICards eval  - TTE ordered  - trending trop x3 unless it rises, then continue to trend  - f/u lipid panel, tsh, HbA1c - non-contributory   - heparin gtt - discontinued   - lopressor started per cards while in patient   - ASA and brilinta daily dosing to start 12/5 per cards - brilinta discontinued   - atorvastatin 80mg per cards - continue at discharge   - [facility] today, most recent trops 2.4. Patent arteries noted, however findings consistent with takotsubo cardiomyopathy, please see "Takotsubo Cardiomyopathy" on problem list   "
61 yo F with 2 days intermittent aching chest pain, extensive smoking history, alcohol use (2-3 beers/day). Physical exam is normal. Troponin initially 2.8, trending down. EKG without ST changes. ASA and brilinta loaded in ED. Cardiology consulted, recommend ACS protocol and interventional cards consult.    - NPO pending ICards eval  - TTE ordered  - trending trop x3 unless it rises, then continue to trend  - f/u lipid panel, tsh, HbA1c  - heparin gtt  - lopressor started per cards  - ASA and brilinta daily dosing to start 12/5 per cards  - atorvastatin 80mg per cards  - [facility] today, most recent trops 2.4     
61 yo F with 2 days intermittent aching chest pain, extensive smoking history, alcohol use (2-3 beers/day). Physical exam is normal. Troponin initially 2.8, trending down. EKG without ST changes. ASA and brilinta loaded in ED. Cardiology consulted, recommend ACS protocol and interventional cards consult.    -NPO pending ICards eval  -TTE ordered  -trending trop x3 unless it rises, then continue to trend  -f/u lipid panel, tsh, HbA1c  -heparin gtt  -lopressor started per cards  -ASA and brilinta daily dosing to start 12/5 per cards  -atorvastatin 80mg per cards    
Continue home bupropion and buspirone      
Continue home bupropion and buspirone      
Pateint found to have indications for takotsubo cardiomyopathy during [facility] today.   - D/C'd ticagrelor and heparin   - Will discharge on ASA, Lipitor, and losartan   - F/U outpatient cardiology, and PCP   - cardiac rehab   - Per cards recommendations abstain from work for 3 weeks, excuse letter added to chart        
Patient reports rolling her own cigarettes and smoking the equivalent of 15 cigarettes per day. Educated on the effects of smoking on heart health.    
Patient reports rolling her own cigarettes and smoking the equivalent of 15 cigarettes per day. Educated on the effects of smoking on heart health.    
Patient was hypertensive during hospital stay, peak SBP >200 in one instance.   - Started on losartan 12.5mg   - Recommend acquiring home blood pressure cuff and record for trends   - Will discharge on short course of losartan, follow-up with cardiology and PCP once established       
Pt is a 62 y.o female with h/o tobacco abuse who presents to the hospital with cp. Trop peaked at 3.7. ekg with no st-t changes.     TTE pending    --LHC +/- PCI, patient is a MARÍA candidate  - Anti-platelet Therapy: ASA / Ticagrelor  - Access: Right radial  - Catheters: J Carlos  - Creatinine/CrCl: 0.8  - Allergies: No shellfish / Iodine allergy  - Pre-Hydration: NS  - Pre-Op Med: Bendaryl 50mg pO   - All patient's questions were answered.  -The risks, benefits and alternatives of the procedure were explained to the patient.   -The risks of coronary angiography include but are not limited to: bleeding, infection, heart rhythm abnormalities, allergic reactions, kidney injury and potential need for dialysis, stroke and death.   - Should stenting be indicated, the patient has agreed to dual anti-platelet therapy for 1-consecutive year with a drug-eluting stent and a minimum of 1-month with the use of a bare metal stent  - Additionally, pt is aware that non-compliance is likely to result in stent clotting with heart attack, heart failure, and/or death  -The risks of moderate sedation include hypotension, respiratory depression, arrhythmias, bronchospasm, and death.   - Informed consent was obtained and the  patient is agreeable to proceed with the procedure.    
none

## 2022-12-05 NOTE — PLAN OF CARE
APPOINTMENT:    Patient has been scheduled for PCP Hospital Follow Up with Ld Payne NP on Friday Dec 9, 2022 at 1130AM.    [facility]  [address] [address]  [phone]    And;  Patient has been scheduled for a Cardiology Hospital Follow Up with Rogerio Collier MD on Tuesday Tunde 3, 2023 at 2PM.   Please bring ID discharge summary and all medications.       [facility]   [address]   4TH FLOOR   [place] [address]  [phone]        CHW met with patient/family at bedside. Patient experience rounding completed and reviewed the following.     Do you know your discharge plan? Yes    If yes, what is the plan? Home    If you are discharging home, do you have help at home? No    Do you think you will need help at home at discharge? No     Have you discussed your needs and preferences with your SW/CM? Yes     Assigned SW/CM notified of any patient/family needs or concerns. Of note, no concerns voiced about discharge.        Blaise Weems  Community Health Worker(CHW)  Case Management  Ext. [phone]

## 2022-12-06 ENCOUNTER — TELEPHONE (OUTPATIENT)
Dept: INTERNAL MEDICINE | Facility: CLINIC | Age: 62
End: 2022-12-06
Payer: COMMERCIAL

## 2022-12-07 ENCOUNTER — NURSE TRIAGE (OUTPATIENT)
Dept: ADMINISTRATIVE | Facility: CLINIC | Age: 62
End: 2022-12-07
Payer: COMMERCIAL

## 2022-12-07 NOTE — TELEPHONE ENCOUNTER
Pt calling asking what her next steps are now that she was released from hospital. Advised that she needs to make her follow up appts. verbalized understanding. Warm transferred to Appt desk Fransisca. verbalized understanding.   Reason for Disposition   Information only question and nurse able to answer    Protocols used: Information Only Call - No Triage-A-OH

## 2022-12-08 NOTE — PHYSICIAN QUERY
PT Name: Megan Mccall  MR #: [id number]     DOCUMENTATION CLARIFICATION      CDS/: Vijaya Raymond RN              Contact information:[email or web address]  This form is a permanent document in the medical record.    Query Date: December 8, 2022    By submitting this query, we are merely seeking further clarification of documentation to reflect the severity of illness of your patient. Please utilize your independent clinical judgment when addressing the question(s) below.     The Medical Record contains the following:   Indicators   Supporting Clinical Findings Location in Medical Record    Chest Pain, Angina      Coronary Artery Disease      EKG     x Troponin TROPONINI 1.860* 3.799* 2.431*       Lab 12-4 to 12-5    Echo Results      Angiography     x Documentation of acute cardiac condition NSTEMI >> type II MI Cardiology note 12-4    Medication/Treatment     x Other Preoperative Diagnosis: NSTEMI (non-ST elevated myocardial infarction) [I21.4]      Postop Diagnosis: NSTEMI (non-ST elevated myocardial infarction) [I21.4]     Treatments/Procedures: Procedure(s) (LRB):  Angiogram, Coronary, with Left Heart Cath (N/A)  Ventriculogram, Left     Access: Right radial artery     Findings: No evidence of obstructive CAD    Ventriculography consistent with Takotsubo Interventional cardiology note 12-5      Provider, please clarify the cardiac diagnosis related to the above documentation:  [   ] NSTEMI   [   ] NSTEMI/Myocardial Infarction Type 2 due to (please specify): Takotsubo cardiomyopathy   x   ] NSTEMI/Myocardial Infarction Type 2 due to (please specify):_stress_____________   [   ] Other Cardiac Diagnosis (please specify): _______________   [   ] Clinically Undetermined       Please document in your progress notes daily for the duration of treatment until resolved, and include in your discharge summary.  Form No. [id number]

## 2022-12-09 ENCOUNTER — TELEPHONE (OUTPATIENT)
Dept: INTERNAL MEDICINE | Facility: CLINIC | Age: 62
End: 2022-12-09
Payer: COMMERCIAL

## 2022-12-09 NOTE — TELEPHONE ENCOUNTER
----- Message from Dulce Richards sent at 12/9/2022  4:02 PM CST -----  Contact: Patient  Type:  Sooner Appointment Request      Name of Caller:Patient   When is the first available appointment?01/03/2023  Symptoms:Hospital follow up  Would the patient rather a call back or a response via [facility]? Call back   Best Call Back Number:[phone]  Additional Information: Please assist

## 2022-12-13 ENCOUNTER — OFFICE VISIT (OUTPATIENT)
Dept: INTERNAL MEDICINE | Facility: CLINIC | Age: 62
End: 2022-12-13
Payer: COMMERCIAL

## 2022-12-13 VITALS
OXYGEN SATURATION: 98 % | HEART RATE: 97 BPM | HEIGHT: 64 IN | DIASTOLIC BLOOD PRESSURE: 79 MMHG | SYSTOLIC BLOOD PRESSURE: 143 MMHG | WEIGHT: 109.38 LBS | BODY MASS INDEX: 18.67 KG/M2

## 2022-12-13 DIAGNOSIS — Z87.891 SMOKING HISTORY: ICD-10-CM

## 2022-12-13 DIAGNOSIS — Z09 HOSPITAL DISCHARGE FOLLOW-UP: ICD-10-CM

## 2022-12-13 DIAGNOSIS — F41.9 ANXIETY AND DEPRESSION: Primary | ICD-10-CM

## 2022-12-13 DIAGNOSIS — F32.A ANXIETY AND DEPRESSION: Primary | ICD-10-CM

## 2022-12-13 PROCEDURE — 3044F PR MOST RECENT HEMOGLOBIN A1C LEVEL <7.0%: ICD-10-PCS | Mod: CPTII,S$GLB,,

## 2022-12-13 PROCEDURE — 3077F PR MOST RECENT SYSTOLIC BLOOD PRESSURE >= 140 MM HG: ICD-10-PCS | Mod: CPTII,S$GLB,,

## 2022-12-13 PROCEDURE — 1111F DSCHRG MED/CURRENT MED MERGE: CPT | Mod: CPTII,S$GLB,,

## 2022-12-13 PROCEDURE — 99999 PR PBB SHADOW E&M-EST. PATIENT-LVL IV: ICD-10-PCS | Mod: PBBFAC,,,

## 2022-12-13 PROCEDURE — 99214 OFFICE O/P EST MOD 30 MIN: CPT | Mod: S$GLB,,,

## 2022-12-13 PROCEDURE — 99999 PR PBB SHADOW E&M-EST. PATIENT-LVL IV: CPT | Mod: PBBFAC,,,

## 2022-12-13 PROCEDURE — 3077F SYST BP >= 140 MM HG: CPT | Mod: CPTII,S$GLB,,

## 2022-12-13 PROCEDURE — 3008F BODY MASS INDEX DOCD: CPT | Mod: CPTII,S$GLB,,

## 2022-12-13 PROCEDURE — 3078F DIAST BP <80 MM HG: CPT | Mod: CPTII,S$GLB,,

## 2022-12-13 PROCEDURE — 4010F ACE/ARB THERAPY RXD/TAKEN: CPT | Mod: CPTII,S$GLB,,

## 2022-12-13 PROCEDURE — 3008F PR BODY MASS INDEX (BMI) DOCUMENTED: ICD-10-PCS | Mod: CPTII,S$GLB,,

## 2022-12-13 PROCEDURE — 3044F HG A1C LEVEL LT 7.0%: CPT | Mod: CPTII,S$GLB,,

## 2022-12-13 PROCEDURE — 3078F PR MOST RECENT DIASTOLIC BLOOD PRESSURE < 80 MM HG: ICD-10-PCS | Mod: CPTII,S$GLB,,

## 2022-12-13 PROCEDURE — 99214 PR OFFICE/OUTPT VISIT, EST, LEVL IV, 30-39 MIN: ICD-10-PCS | Mod: S$GLB,,,

## 2022-12-13 PROCEDURE — 1111F PR DISCHARGE MEDS RECONCILED W/ CURRENT OUTPATIENT MED LIST: ICD-10-PCS | Mod: CPTII,S$GLB,,

## 2022-12-13 PROCEDURE — 4010F PR ACE/ARB THEARPY RXD/TAKEN: ICD-10-PCS | Mod: CPTII,S$GLB,,

## 2022-12-13 RX ORDER — ESCITALOPRAM OXALATE 10 MG/1
10 TABLET ORAL DAILY
Qty: 30 TABLET | Refills: 11 | Status: SHIPPED | OUTPATIENT
Start: 2022-12-13 | End: 2023-03-30 | Stop reason: SDUPTHER

## 2022-12-13 NOTE — PROGRESS NOTES
RESIDENT CLINIC PROGRESS NOTE    Name: Megan Mccall  : 1960  Date of Service: 2022   PCP: [facility]    Reason for visit:   Chief Complaint   Patient presents with    Follow-up       HPI: Megan Mccall is a 62 y.o. female with a PMH of anxiety, depression, NSTEMI, and takosubo cardiomyopathy who presents to clinic for hospital follow up. She was admitted to [facility] from - for chest pain with elevated troponin (troponin peaked to 3.799). She was diagnosed with NSTEMI with BP elevated to a systolic of over 200. She had an LHC that showed patent arteries but showed evidence of apical ballooning consistent with takosubo cardiomyopathy. She was discharged on aspirin, Lipitor, and losartan with instruction to follow up with cardiology and primary care. She will have an additional visit with Dr. Brad Tomlinson on  who will be her primary care physician.   Today: She is feeling well and back to her usual state of health. She is worried about some increased anxiety over her condition and describes trouble sleeping. She has an appointment with [facility] cardiology but expresses wishes to be seen by a cardiologist at [facility].     Medications:   Current Outpatient Medications:     aspirin 81 MG Chew, Chew and swallow 1 tablet (81 mg total) by mouth once daily., Disp: 30 tablet, Rfl: 0    atorvastatin (LIPITOR) 80 MG tablet, Take 1 tablet (80 mg total) by mouth once daily., Disp: 30 tablet, Rfl: 0    buPROPion (WELLBUTRIN SR) 150 MG TBSR 12 hr tablet, Take 150 mg by mouth 2 (two) times daily., Disp: , Rfl:     busPIRone (BUSPAR) 15 MG tablet, Take 15 mg by mouth 3 (three) times daily., Disp: , Rfl:     cetirizine (ZYRTEC) 10 MG tablet, Take 1 tablet (10 mg total) by mouth once daily. (Patient not taking: Reported on 10/20/2022), Disp: 30 tablet, Rfl: 2    losartan (COZAAR) 25 MG tablet, Take ONE-HALF (0.5) tablets (12.5 mg total) by mouth once daily., Disp: 15 tablet, Rfl: 0  "    Review of Systems:   Review of Systems   Constitutional:  Negative for chills, fever and weight loss.   Eyes:  Negative for blurred vision and double vision.   Respiratory:  Negative for cough, shortness of breath and wheezing.    Cardiovascular:  Negative for chest pain and leg swelling.   Gastrointestinal:  Negative for abdominal pain, constipation, diarrhea, nausea and vomiting.   Genitourinary:  Negative for dysuria, frequency and urgency.   Skin:  Negative for rash.   Neurological:  Negative for dizziness, weakness and headaches.   Psychiatric/Behavioral:  Positive for depression. The patient is nervous/anxious.      Vitals:   Vitals:    12/13/22 1405   BP: (!) 143/79   BP Location: Right arm   Patient Position: Sitting   BP Method: Medium (Manual)   Pulse: 97   SpO2: 98%   Weight: 49.6 kg (109 lb 5.6 oz)   Height: 5' 4" (1.626 m)     Body mass index is 18.77 kg/m².    Physical Exam:   Physical Exam  Constitutional:       General: She is not in acute distress.     Appearance: Normal appearance. She is not ill-appearing.   HENT:      Head: Normocephalic and atraumatic.      Nose: Nose normal.      Mouth/Throat:      Mouth: Mucous membranes are dry.   Eyes:      Extraocular Movements: Extraocular movements intact.      Conjunctiva/sclera: Conjunctivae normal.      Pupils: Pupils are equal, round, and reactive to light.   Cardiovascular:      Rate and Rhythm: Normal rate and regular rhythm.      Pulses: Normal pulses.      Heart sounds: Normal heart sounds.   Pulmonary:      Effort: Pulmonary effort is normal.      Breath sounds: No wheezing or rales.      Comments: More coarse breath sounds.  Abdominal:      General: Abdomen is flat. Bowel sounds are normal. There is no distension.      Palpations: Abdomen is soft.      Tenderness: There is no abdominal tenderness.   Musculoskeletal:         General: Normal range of motion.      Right lower leg: No edema.      Left lower leg: No edema.   Skin:     General: " Skin is warm and dry.      Capillary Refill: Capillary refill takes less than 2 seconds.      Findings: No rash.   Neurological:      Mental Status: She is alert and oriented to person, place, and time.   Psychiatric:         Mood and Affect: Mood is anxious and depressed.         Behavior: Behavior normal.         Thought Content: Thought content normal.         Judgment: Judgment normal.     Labs: Previous labs reviewed.    Imaging: Previous imaging reviewed.     Assessment/Plan:   Patient is here for hospital follow up after an NSTEMI. She is feeling well but has had some increase anxiety. We discussed transitioning her from her Wellbutrin to an SSRI as this may give her better anxiety control as well as BP control. She expresses interest in smoking cessation though she feels that she may not be ready quite yet. She still needs to set up an appointment with [facility].     Anxiety and depression  -     EScitalopram oxalate (LEXAPRO) 10 MG tablet; Take 1 tablet (10 mg total) by mouth once daily.  Dispense: 30 tablet; Refill: 11  - Continue taking Buspar as needed    Smoking history  -     Ambulatory referral/consult to Smoking Cessation Program; Future; Expected date: 12/20/2022    Hospital discharge follow-up  -     Ambulatory referral/consult to Cardiology; Future; Expected date: 12/20/2022        Preventative Health: Counseled patient on importance of tobacco cessation.       Discussed with Dr. Farooq       I have personally seen patient and agree with the A/P as noted above.  Dian Giles.

## 2022-12-13 NOTE — PATIENT INSTRUCTIONS
Stop taking your welbutrin and start taking lexapro while taking the Buspar as needed.    Follow up with cardiology at [facility] (They will call you)    Follow up with smoking cessation program (They will call you)    Mention further problems with sleep and anxiety at follow up appointment with Dr. Tolminson in January.

## 2022-12-15 PROBLEM — I25.10 CORONARY ARTERY DISEASE INVOLVING NATIVE CORONARY ARTERY OF NATIVE HEART WITHOUT ANGINA PECTORIS: Status: ACTIVE | Noted: 2022-12-15

## 2022-12-15 NOTE — PROGRESS NOTES
"Subjective:           Chief Complaint: Hospital Follow Up      HPI    62 year old female with CAD (25% prox LAD stenosis 12/22), apical hypokinesis on left ventriculography (not seen on TTE but evident on ventriculogram), tobacco use here for hospital follow-up.    She had initially had left sided chest pain described as an "ache". She has not had any recurrence of angina. She denies chest pain/pressure/tightness/discomfort, dyspnea on exertion, orthopnea, PND, peripheral edema, palpitations, syncope or claudication.    No routine exercise program but she walks (2000 steps per day).     She smokes but has been cutting back. Drinks 3 beers per day and may drink some wine. She does have anxiety and is treated by a psychiatrist.    Review of Systems   Constitutional:  Negative for fever.   HENT:  Negative for nosebleeds.    Eyes:  Negative for visual disturbance.   Respiratory:  Negative for shortness of breath.    Cardiovascular:  Negative for palpitations.   Gastrointestinal:  Negative for blood in stool.   Genitourinary:  Negative for hematuria.   Musculoskeletal:  Negative for gait problem.   Integumentary:  Negative for rash.   Neurological:  Negative for syncope.       Objective:      Vitals:    12/20/22 1304   BP: (!) 147/73   Pulse: 95       Physical Exam  Constitutional:       Appearance: She is well-developed. She is not diaphoretic.   HENT:      Head: Normocephalic and atraumatic.   Eyes:      Pupils: Pupils are equal, round, and reactive to light.   Neck:      Vascular: No carotid bruit or JVD.   Cardiovascular:      Rate and Rhythm: Normal rate and regular rhythm.      Heart sounds: Normal heart sounds. Heart sounds not distant. No murmur heard.    No friction rub. No gallop. No S3 or S4 sounds.   Pulmonary:      Effort: Pulmonary effort is normal. No respiratory distress.      Breath sounds: Normal breath sounds. No wheezing.   Abdominal:      General: Bowel sounds are normal. There is no distension.      " Palpations: Abdomen is soft.      Tenderness: There is no abdominal tenderness.   Musculoskeletal:         General: No swelling.      Cervical back: Normal range of motion.   Skin:     General: Skin is warm.      Findings: No erythema.   Neurological:      Mental Status: She is alert and oriented to person, place, and time.   Psychiatric:         Behavior: Behavior normal.         Assessment:       1. Coronary artery disease involving native coronary artery of native heart without angina pectoris    2. Tobacco use    3. Hospital discharge follow-up    4. Takotsubo cardiomyopathy          Plan     Coronary artery disease involving native coronary artery of native heart without angina pectoris  MINOCA  Takotsubo cardiomyopathy  CCS 0  Continue ASA, high intensity statin, ARB  Start Metoprolol succinate 25 mg once daily  LDL 94, only started statin recently  Referral sent to cardiac rehab    Elevated BP reading  Not previously diagnosed with HTN  She has a BP log on her phone  For blood pressure readings, sit in a calm, quiet room with dim lighting. Feet flat on the floor, without stimulation (no TV, no talking) and relax for at least 3-5 minutes prior to measuring.  We discussed a low sodium diet, daily alcohol limits and regular exercise    Tobacco use  Working on quitting, has info regarding the cessation program

## 2022-12-19 ENCOUNTER — TELEPHONE (OUTPATIENT)
Dept: CARDIAC REHAB | Facility: CLINIC | Age: 62
End: 2022-12-19
Payer: COMMERCIAL

## 2022-12-20 ENCOUNTER — OFFICE VISIT (OUTPATIENT)
Dept: CARDIOLOGY | Facility: CLINIC | Age: 62
End: 2022-12-20
Payer: COMMERCIAL

## 2022-12-20 VITALS
WEIGHT: 109.13 LBS | HEIGHT: 64 IN | BODY MASS INDEX: 18.63 KG/M2 | DIASTOLIC BLOOD PRESSURE: 73 MMHG | HEART RATE: 95 BPM | SYSTOLIC BLOOD PRESSURE: 147 MMHG

## 2022-12-20 DIAGNOSIS — I51.81 TAKOTSUBO CARDIOMYOPATHY: ICD-10-CM

## 2022-12-20 DIAGNOSIS — I25.10 CORONARY ARTERY DISEASE INVOLVING NATIVE CORONARY ARTERY OF NATIVE HEART WITHOUT ANGINA PECTORIS: Primary | ICD-10-CM

## 2022-12-20 DIAGNOSIS — Z72.0 TOBACCO USE: ICD-10-CM

## 2022-12-20 DIAGNOSIS — R03.0 ELEVATED BLOOD PRESSURE READING: ICD-10-CM

## 2022-12-20 DIAGNOSIS — Z09 HOSPITAL DISCHARGE FOLLOW-UP: ICD-10-CM

## 2022-12-20 PROCEDURE — 3078F PR MOST RECENT DIASTOLIC BLOOD PRESSURE < 80 MM HG: ICD-10-PCS | Mod: CPTII,S$GLB,, | Performed by: INTERNAL MEDICINE

## 2022-12-20 PROCEDURE — 1159F PR MEDICATION LIST DOCUMENTED IN MEDICAL RECORD: ICD-10-PCS | Mod: CPTII,S$GLB,, | Performed by: INTERNAL MEDICINE

## 2022-12-20 PROCEDURE — 99999 PR PBB SHADOW E&M-EST. PATIENT-LVL IV: CPT | Mod: PBBFAC,,, | Performed by: INTERNAL MEDICINE

## 2022-12-20 PROCEDURE — 1111F PR DISCHARGE MEDS RECONCILED W/ CURRENT OUTPATIENT MED LIST: ICD-10-PCS | Mod: CPTII,S$GLB,, | Performed by: INTERNAL MEDICINE

## 2022-12-20 PROCEDURE — 99999 PR PBB SHADOW E&M-EST. PATIENT-LVL IV: ICD-10-PCS | Mod: PBBFAC,,, | Performed by: INTERNAL MEDICINE

## 2022-12-20 PROCEDURE — 4010F PR ACE/ARB THEARPY RXD/TAKEN: ICD-10-PCS | Mod: CPTII,S$GLB,, | Performed by: INTERNAL MEDICINE

## 2022-12-20 PROCEDURE — 3008F BODY MASS INDEX DOCD: CPT | Mod: CPTII,S$GLB,, | Performed by: INTERNAL MEDICINE

## 2022-12-20 PROCEDURE — 1160F RVW MEDS BY RX/DR IN RCRD: CPT | Mod: CPTII,S$GLB,, | Performed by: INTERNAL MEDICINE

## 2022-12-20 PROCEDURE — 1160F PR REVIEW ALL MEDS BY PRESCRIBER/CLIN PHARMACIST DOCUMENTED: ICD-10-PCS | Mod: CPTII,S$GLB,, | Performed by: INTERNAL MEDICINE

## 2022-12-20 PROCEDURE — 3077F SYST BP >= 140 MM HG: CPT | Mod: CPTII,S$GLB,, | Performed by: INTERNAL MEDICINE

## 2022-12-20 PROCEDURE — 3077F PR MOST RECENT SYSTOLIC BLOOD PRESSURE >= 140 MM HG: ICD-10-PCS | Mod: CPTII,S$GLB,, | Performed by: INTERNAL MEDICINE

## 2022-12-20 PROCEDURE — 3044F HG A1C LEVEL LT 7.0%: CPT | Mod: CPTII,S$GLB,, | Performed by: INTERNAL MEDICINE

## 2022-12-20 PROCEDURE — 1159F MED LIST DOCD IN RCRD: CPT | Mod: CPTII,S$GLB,, | Performed by: INTERNAL MEDICINE

## 2022-12-20 PROCEDURE — 99214 OFFICE O/P EST MOD 30 MIN: CPT | Mod: S$GLB,,, | Performed by: INTERNAL MEDICINE

## 2022-12-20 PROCEDURE — 3008F PR BODY MASS INDEX (BMI) DOCUMENTED: ICD-10-PCS | Mod: CPTII,S$GLB,, | Performed by: INTERNAL MEDICINE

## 2022-12-20 PROCEDURE — 4010F ACE/ARB THERAPY RXD/TAKEN: CPT | Mod: CPTII,S$GLB,, | Performed by: INTERNAL MEDICINE

## 2022-12-20 PROCEDURE — 3044F PR MOST RECENT HEMOGLOBIN A1C LEVEL <7.0%: ICD-10-PCS | Mod: CPTII,S$GLB,, | Performed by: INTERNAL MEDICINE

## 2022-12-20 PROCEDURE — 99214 PR OFFICE/OUTPT VISIT, EST, LEVL IV, 30-39 MIN: ICD-10-PCS | Mod: S$GLB,,, | Performed by: INTERNAL MEDICINE

## 2022-12-20 PROCEDURE — 3078F DIAST BP <80 MM HG: CPT | Mod: CPTII,S$GLB,, | Performed by: INTERNAL MEDICINE

## 2022-12-20 PROCEDURE — 1111F DSCHRG MED/CURRENT MED MERGE: CPT | Mod: CPTII,S$GLB,, | Performed by: INTERNAL MEDICINE

## 2022-12-20 RX ORDER — METOPROLOL SUCCINATE 25 MG/1
25 TABLET, EXTENDED RELEASE ORAL DAILY
Qty: 30 TABLET | Refills: 11 | Status: SHIPPED | OUTPATIENT
Start: 2022-12-20 | End: 2023-03-30

## 2023-01-03 ENCOUNTER — OFFICE VISIT (OUTPATIENT)
Dept: INTERNAL MEDICINE | Facility: CLINIC | Age: 63
End: 2023-01-03
Payer: COMMERCIAL

## 2023-01-03 VITALS
BODY MASS INDEX: 19.12 KG/M2 | HEIGHT: 64 IN | SYSTOLIC BLOOD PRESSURE: 109 MMHG | WEIGHT: 112 LBS | HEART RATE: 75 BPM | OXYGEN SATURATION: 97 % | DIASTOLIC BLOOD PRESSURE: 68 MMHG

## 2023-01-03 DIAGNOSIS — I51.81 TAKOTSUBO CARDIOMYOPATHY: Primary | ICD-10-CM

## 2023-01-03 DIAGNOSIS — Z00.00 ROUTINE ADULT HEALTH MAINTENANCE: ICD-10-CM

## 2023-01-03 DIAGNOSIS — I25.10 CORONARY ARTERY DISEASE INVOLVING NATIVE CORONARY ARTERY OF NATIVE HEART WITHOUT ANGINA PECTORIS: ICD-10-CM

## 2023-01-03 PROCEDURE — 3008F BODY MASS INDEX DOCD: CPT | Mod: CPTII,S$GLB,,

## 2023-01-03 PROCEDURE — 1111F PR DISCHARGE MEDS RECONCILED W/ CURRENT OUTPATIENT MED LIST: ICD-10-PCS | Mod: CPTII,S$GLB,,

## 2023-01-03 PROCEDURE — 1111F DSCHRG MED/CURRENT MED MERGE: CPT | Mod: CPTII,S$GLB,,

## 2023-01-03 PROCEDURE — 99213 PR OFFICE/OUTPT VISIT, EST, LEVL III, 20-29 MIN: ICD-10-PCS | Mod: S$GLB,,,

## 2023-01-03 PROCEDURE — 3074F SYST BP LT 130 MM HG: CPT | Mod: CPTII,S$GLB,,

## 2023-01-03 PROCEDURE — 99999 PR PBB SHADOW E&M-EST. PATIENT-LVL III: ICD-10-PCS | Mod: PBBFAC,,,

## 2023-01-03 PROCEDURE — 4010F PR ACE/ARB THEARPY RXD/TAKEN: ICD-10-PCS | Mod: CPTII,S$GLB,,

## 2023-01-03 PROCEDURE — 99999 PR PBB SHADOW E&M-EST. PATIENT-LVL III: CPT | Mod: PBBFAC,,,

## 2023-01-03 PROCEDURE — 99213 OFFICE O/P EST LOW 20 MIN: CPT | Mod: S$GLB,,,

## 2023-01-03 PROCEDURE — 3078F DIAST BP <80 MM HG: CPT | Mod: CPTII,S$GLB,,

## 2023-01-03 PROCEDURE — 3078F PR MOST RECENT DIASTOLIC BLOOD PRESSURE < 80 MM HG: ICD-10-PCS | Mod: CPTII,S$GLB,,

## 2023-01-03 PROCEDURE — 4010F ACE/ARB THERAPY RXD/TAKEN: CPT | Mod: CPTII,S$GLB,,

## 2023-01-03 PROCEDURE — 3074F PR MOST RECENT SYSTOLIC BLOOD PRESSURE < 130 MM HG: ICD-10-PCS | Mod: CPTII,S$GLB,,

## 2023-01-03 PROCEDURE — 3008F PR BODY MASS INDEX (BMI) DOCUMENTED: ICD-10-PCS | Mod: CPTII,S$GLB,,

## 2023-01-03 RX ORDER — ATORVASTATIN CALCIUM 80 MG/1
80 TABLET, FILM COATED ORAL DAILY
Qty: 30 TABLET | Refills: 11 | Status: SHIPPED | OUTPATIENT
Start: 2023-01-03 | End: 2023-03-30

## 2023-01-03 RX ORDER — LOSARTAN POTASSIUM 25 MG/1
12.5 TABLET ORAL DAILY
Qty: 15 TABLET | Refills: 11 | Status: SHIPPED | OUTPATIENT
Start: 2023-01-03 | End: 2023-03-30

## 2023-01-03 RX ORDER — NAPROXEN SODIUM 220 MG/1
81 TABLET, FILM COATED ORAL DAILY
Qty: 30 TABLET | Refills: 11 | Status: SHIPPED | OUTPATIENT
Start: 2023-01-03 | End: 2023-03-30

## 2023-01-03 NOTE — PATIENT INSTRUCTIONS
Thank you for coming to establish. Keep following up with cardiology and [facility] regarding your mammogram. [facility] Please contact us with any questions or concerns you may have!

## 2023-01-03 NOTE — PROGRESS NOTES
Clinic Note  1/3/2023      Subjective:       Patient ID:  Megan is a 62 y.o. female being seen for an established visit.    Chief Complaint: Establish Care    HPI  Ms. Mccall is a 62 year old female with past medical hx of anxiety and depression with a recent hospitalization of NSTEMI and takotsubo cardiomyopathy here to establish. Since then she has been seen at her routine hospital follow-up, and cardiology who started her on toprol. Per last visit she was switched from Wellbutrin to Lexapro due to concerns of increased anxiety with the former medication. She reports no recurrence of chest pain, and denies any new or worsening symptoms. Tobacco usage has decreased from reported chain smoking to 4x cigarettes daily, which she rolls herself. She endorses an increase in physical  activity by taking walks. She obtained a home blood pressure cuff where she reports SBP of 120s regularly with a 1x peak of 140s. She has also connected with a therapist, lightened her work load with the support of her coworkers, and began reading various self help materials. In terms of health maintenance she is currently scheduling her mammogram, and visits her dentist 2x a year. She does need to see optometry. She reports a colonoscopy from 4-5 years ago where nothing was found. She denies a personal, or family history of colon cancer.        Review of Systems   Constitutional: Negative.  Negative for chills, diaphoresis and fever.   HENT:  Negative for congestion and sore throat.    Eyes:  Negative for blurred vision, photophobia, pain and discharge.   Respiratory:  Negative for cough, shortness of breath and wheezing.    Cardiovascular: Negative.  Negative for chest pain and palpitations.   Gastrointestinal: Negative.  Negative for abdominal pain, constipation, diarrhea, heartburn, nausea and vomiting.   Musculoskeletal:  Negative for joint pain and myalgias.   Skin: Negative.  Negative for itching and rash.   Neurological:  Negative  for dizziness, weakness and headaches.   Endo/Heme/Allergies:  Negative for environmental allergies. Does not bruise/bleed easily.   Psychiatric/Behavioral:  The patient is nervous/anxious.      Past Medical History:   Diagnosis Date    Anxiety     Depression        Family History   Problem Relation Age of Onset    Heart disease Father     Heart attack Father 59    Diabetes Paternal Grandmother         reports that she has been smoking cigarettes. She started smoking about 30 years ago. She has a 15.41 pack-year smoking history. She has never used smokeless tobacco. She reports current alcohol use of about 18.0 standard drinks per week. She reports that she does not use drugs.    Medication List with Changes/Refills   Current Medications    BUSPIRONE (BUSPAR) 15 MG TABLET    Take 15 mg by mouth 3 (three) times daily.    ESCITALOPRAM OXALATE (LEXAPRO) 10 MG TABLET    Take 1 tablet (10 mg total) by mouth once daily.    METOPROLOL SUCCINATE (TOPROL-XL) 25 MG 24 HR TABLET    Take 1 tablet (25 mg total) by mouth once daily.   Changed and/or Refilled Medications    Modified Medication Previous Medication    ASPIRIN 81 MG CHEW aspirin 81 MG Chew       Chew and swallow 1 tablet (81 mg total) by mouth once daily.    Chew and swallow 1 tablet (81 mg total) by mouth once daily.    ATORVASTATIN (LIPITOR) 80 MG TABLET atorvastatin (LIPITOR) 80 MG tablet       Take 1 tablet (80 mg total) by mouth once daily.    Take 1 tablet (80 mg total) by mouth once daily.    LOSARTAN (COZAAR) 25 MG TABLET losartan (COZAAR) 25 MG tablet       Take ONE-HALF (0.5) tablets (12.5 mg total) by mouth once daily.    Take ONE-HALF (0.5) tablets (12.5 mg total) by mouth once daily.     Review of patient's allergies indicates:  No Known Allergies    Patient Active Problem List   Diagnosis    Family history of ischemic heart disease (IHD)    Rectal polyp    NSTEMI (non-ST elevated myocardial infarction)    Anxiety and depression    Tobacco use     "Takotsubo cardiomyopathy    Elevated blood pressure reading without diagnosis of hypertension    Coronary artery disease involving native coronary artery of native heart without angina pectoris           Objective:      /68 (BP Location: Left arm, Patient Position: Sitting, BP Method: Medium (Automatic))   Pulse 75   Ht 5' 4" (1.626 m)   Wt 50.8 kg (111 lb 15.9 oz)   SpO2 97%   Breastfeeding No   BMI 19.22 kg/m²   Estimated body mass index is 19.22 kg/m² as calculated from the following:    Height as of this encounter: 5' 4" (1.626 m).    Weight as of this encounter: 50.8 kg (111 lb 15.9 oz).  Physical Exam  Constitutional:       General: She is not in acute distress.     Appearance: Normal appearance. She is normal weight. She is not ill-appearing or toxic-appearing.   HENT:      Head: Normocephalic and atraumatic.   Cardiovascular:      Rate and Rhythm: Normal rate and regular rhythm.      Pulses: Normal pulses.      Heart sounds: Normal heart sounds. No murmur heard.  Pulmonary:      Effort: Pulmonary effort is normal. No respiratory distress.      Breath sounds: Normal breath sounds. No wheezing or rales.   Chest:      Chest wall: No tenderness.   Abdominal:      General: Abdomen is flat. Bowel sounds are normal. There is no distension.      Palpations: Abdomen is soft. There is no mass.      Tenderness: There is no abdominal tenderness. There is no guarding.      Hernia: No hernia is present.   Musculoskeletal:         General: No swelling, tenderness, deformity or signs of injury. Normal range of motion.   Skin:     General: Skin is warm and dry.      Coloration: Skin is not jaundiced.      Findings: No bruising or erythema.   Neurological:      General: No focal deficit present.      Mental Status: She is alert and oriented to person, place, and time.      Cranial Nerves: No cranial nerve deficit.      Motor: No weakness.   Psychiatric:         Mood and Affect: Mood normal.         Behavior: " Behavior normal.         Thought Content: Thought content normal.         Judgment: Judgment normal.       Assessment and Plan:         Megan was seen today for establish care. Patient has been proactive since hospital discharge regarding reducing work related stress as well as modifying risk factors such as decreasing tobacco intake. Patient will coordinate with Camila regarding her mammogram where she had a prior appointment that was missed. Also stated to further ask about cervical cancer screening as she states it has been many years. Will further refer to optometry for eye examination. Will send refills on ASA, statin, and ARB as those were given at discharge from the hospital for one month and she will run out in a couple days.  Recommended pneumococcal vaccine (PREVNAR 20) due to cardiac pathology related risk factors which patient agrees to receive. Will follow-up in 3 months.     Diagnoses and all orders for this visit:    Coronary artery disease involving native coronary artery of native heart without angina pectoris  Takotsubo cardiomyopathy  -     aspirin 81 MG Chew; Chew and swallow 1 tablet (81 mg total) by mouth once daily. Refilled   -     atorvastatin (LIPITOR) 80 MG tablet; Take 1 tablet (80 mg total) by mouth once daily. Refilled   -     losartan (COZAAR) 25 MG tablet; Take ONE-HALF (0.5) tablets (12.5 mg total) by mouth once daily. Refilled     Routine adult health maintenance  -     Ambulatory referral/consult to Optometry; Future        Follow up in about 3 months (around 4/3/2023).    Other Orders Placed This Visit:  Orders Placed This Encounter   Procedures    Ambulatory referral/consult to Optometry           Brad Tomlinson DO, JOSEY   Internal Medicine, PGY-I  [facility]  [address] [place] [id number]  [phone]

## 2023-03-02 ENCOUNTER — PATIENT MESSAGE (OUTPATIENT)
Dept: RESEARCH | Facility: HOSPITAL | Age: 63
End: 2023-03-02
Payer: COMMERCIAL

## 2023-03-03 ENCOUNTER — TELEPHONE (OUTPATIENT)
Dept: INTERNAL MEDICINE | Facility: CLINIC | Age: 63
End: 2023-03-03
Payer: COMMERCIAL

## 2023-03-03 NOTE — TELEPHONE ENCOUNTER
LVM notifying pt of appt cancellation([place]), and that they may contact the clinic to reschedule.

## 2023-03-06 PROBLEM — I21.4 NSTEMI (NON-ST ELEVATED MYOCARDIAL INFARCTION): Status: RESOLVED | Noted: 2022-12-04 | Resolved: 2023-03-06

## 2023-03-30 ENCOUNTER — OFFICE VISIT (OUTPATIENT)
Dept: INTERNAL MEDICINE | Facility: CLINIC | Age: 63
End: 2023-03-30
Payer: COMMERCIAL

## 2023-03-30 VITALS
BODY MASS INDEX: 19.57 KG/M2 | SYSTOLIC BLOOD PRESSURE: 110 MMHG | OXYGEN SATURATION: 98 % | HEART RATE: 103 BPM | WEIGHT: 114.63 LBS | DIASTOLIC BLOOD PRESSURE: 78 MMHG | RESPIRATION RATE: 18 BRPM | HEIGHT: 64 IN

## 2023-03-30 DIAGNOSIS — I51.81 TAKOTSUBO CARDIOMYOPATHY: Primary | ICD-10-CM

## 2023-03-30 DIAGNOSIS — F41.9 ANXIETY AND DEPRESSION: ICD-10-CM

## 2023-03-30 DIAGNOSIS — I51.81 STRESS-INDUCED CARDIOMYOPATHY: ICD-10-CM

## 2023-03-30 DIAGNOSIS — F32.A ANXIETY AND DEPRESSION: ICD-10-CM

## 2023-03-30 DIAGNOSIS — I25.10 CORONARY ARTERY DISEASE INVOLVING NATIVE CORONARY ARTERY OF NATIVE HEART WITHOUT ANGINA PECTORIS: ICD-10-CM

## 2023-03-30 DIAGNOSIS — Z72.0 TOBACCO USE: ICD-10-CM

## 2023-03-30 PROCEDURE — 99999 PR PBB SHADOW E&M-EST. PATIENT-LVL III: CPT | Mod: PBBFAC,,,

## 2023-03-30 PROCEDURE — 3074F SYST BP LT 130 MM HG: CPT | Mod: CPTII,S$GLB,,

## 2023-03-30 PROCEDURE — 99213 OFFICE O/P EST LOW 20 MIN: CPT | Mod: S$GLB,,,

## 2023-03-30 PROCEDURE — 3078F DIAST BP <80 MM HG: CPT | Mod: CPTII,S$GLB,,

## 2023-03-30 PROCEDURE — 1159F MED LIST DOCD IN RCRD: CPT | Mod: CPTII,S$GLB,,

## 2023-03-30 PROCEDURE — 1160F PR REVIEW ALL MEDS BY PRESCRIBER/CLIN PHARMACIST DOCUMENTED: ICD-10-PCS | Mod: CPTII,S$GLB,,

## 2023-03-30 PROCEDURE — 3008F PR BODY MASS INDEX (BMI) DOCUMENTED: ICD-10-PCS | Mod: CPTII,S$GLB,,

## 2023-03-30 PROCEDURE — 1160F RVW MEDS BY RX/DR IN RCRD: CPT | Mod: CPTII,S$GLB,,

## 2023-03-30 PROCEDURE — 3074F PR MOST RECENT SYSTOLIC BLOOD PRESSURE < 130 MM HG: ICD-10-PCS | Mod: CPTII,S$GLB,,

## 2023-03-30 PROCEDURE — 99999 PR PBB SHADOW E&M-EST. PATIENT-LVL III: ICD-10-PCS | Mod: PBBFAC,,,

## 2023-03-30 PROCEDURE — 3078F PR MOST RECENT DIASTOLIC BLOOD PRESSURE < 80 MM HG: ICD-10-PCS | Mod: CPTII,S$GLB,,

## 2023-03-30 PROCEDURE — 1159F PR MEDICATION LIST DOCUMENTED IN MEDICAL RECORD: ICD-10-PCS | Mod: CPTII,S$GLB,,

## 2023-03-30 PROCEDURE — 99213 PR OFFICE/OUTPT VISIT, EST, LEVL III, 20-29 MIN: ICD-10-PCS | Mod: S$GLB,,,

## 2023-03-30 PROCEDURE — 3008F BODY MASS INDEX DOCD: CPT | Mod: CPTII,S$GLB,,

## 2023-03-30 RX ORDER — LOSARTAN POTASSIUM 25 MG/1
12.5 TABLET ORAL DAILY
Qty: 45 TABLET | Refills: 3 | Status: SHIPPED | OUTPATIENT
Start: 2023-03-30 | End: 2024-02-20 | Stop reason: SDUPTHER

## 2023-03-30 RX ORDER — METOPROLOL SUCCINATE 25 MG/1
25 TABLET, EXTENDED RELEASE ORAL DAILY
Qty: 90 TABLET | Refills: 3 | Status: SHIPPED | OUTPATIENT
Start: 2023-03-30 | End: 2024-02-20 | Stop reason: SDUPTHER

## 2023-03-30 RX ORDER — ATORVASTATIN CALCIUM 80 MG/1
80 TABLET, FILM COATED ORAL DAILY
Qty: 90 TABLET | Refills: 3 | Status: SHIPPED | OUTPATIENT
Start: 2023-03-30 | End: 2024-02-20 | Stop reason: SDUPTHER

## 2023-03-30 RX ORDER — ESCITALOPRAM OXALATE 10 MG/1
10 TABLET ORAL DAILY
Qty: 90 TABLET | Refills: 3 | Status: CANCELLED | OUTPATIENT
Start: 2023-03-30 | End: 2024-03-29

## 2023-03-30 RX ORDER — ESCITALOPRAM OXALATE 10 MG/1
10 TABLET ORAL DAILY
Qty: 90 TABLET | Refills: 3 | Status: SHIPPED | OUTPATIENT
Start: 2023-03-30 | End: 2024-03-12

## 2023-03-30 RX ORDER — NAPROXEN SODIUM 220 MG/1
81 TABLET, FILM COATED ORAL DAILY
Qty: 90 TABLET | Refills: 3 | Status: SHIPPED | OUTPATIENT
Start: 2023-03-30 | End: 2024-02-20 | Stop reason: SDUPTHER

## 2023-03-30 NOTE — PROGRESS NOTES
Subjective     Chief Complaint: Follow-up     History of Present Illness:  Ms. Megan Mccall is a 62 y.o. female with significant PMHx of Takotsubo cardiomyopathy here for 3 month follow-up. She has been doing well overall and denies any recurrent chest pain, SOB, or dizziness. She states her smoking has increased mildly since last visit but overall decreased when compared to pre-hospitalization late last year. She reports drinking nightly after work. She is interested in having a 2 month supply of medications vs 1 month.     Review of Systems   Constitutional:  Negative for chills, fever and malaise/fatigue.   HENT:  Negative for congestion and sore throat.    Respiratory:  Negative for shortness of breath and wheezing.    Cardiovascular:  Negative for chest pain and palpitations.   Gastrointestinal:  Negative for abdominal pain, constipation, diarrhea, heartburn, nausea and vomiting.   Skin:  Negative for itching and rash.   Neurological:  Negative for dizziness and headaches.   Psychiatric/Behavioral:  Negative for depression. The patient is not nervous/anxious.      PAST HISTORY:     Past Medical History:   Diagnosis Date    Anxiety     Depression        Past Surgical History:   Procedure Laterality Date    ANGIOGRAM, CORONARY, WITH LEFT HEART CATHETERIZATION N/A 12/5/2022    Procedure: Angiogram, Coronary, with Left Heart Cath;  Surgeon: Gerardo Reynolds Jr., MD;  Location: [facility] CATH LAB;  Service: Cardiology;  Laterality: N/A;    NONE         Family History   Problem Relation Age of Onset    Heart disease Father     Heart attack Father 59    Diabetes Paternal Grandmother        Social History     Tobacco Use    Smoking status: Every Day     Packs/day: 0.67     Years: 23.00     Pack years: 15.41     Types: Cigarettes     Start date: 8/25/1992    Smokeless tobacco: Never   Substance Use Topics    Alcohol use: Yes     Alcohol/week: 18.0 standard drinks     Types: 18 Cans of beer per week     Comment: daily  "   Drug use: No       MEDICATIONS & ALLERGIES:     Current Outpatient Medications on File Prior to Visit   Medication Sig    busPIRone (BUSPAR) 15 MG tablet Take 15 mg by mouth 3 (three) times daily.    pneumoc 20-guillaume conj-dip cr,PF, (PREVNAR-20, PF,) 0.5 mL Syrg injection Inject 0.5 mLs into the muscle.    [DISCONTINUED] aspirin 81 MG Chew Chew and swallow 1 tablet (81 mg total) by mouth once daily.    [DISCONTINUED] atorvastatin (LIPITOR) 80 MG tablet Take 1 tablet (80 mg total) by mouth once daily.    [DISCONTINUED] EScitalopram oxalate (LEXAPRO) 10 MG tablet Take 1 tablet (10 mg total) by mouth once daily.    [DISCONTINUED] losartan (COZAAR) 25 MG tablet Take ONE-HALF (0.5) tablets (12.5 mg total) by mouth once daily.    [DISCONTINUED] metoprolol succinate (TOPROL-XL) 25 MG 24 hr tablet Take 1 tablet (25 mg total) by mouth once daily.     No current facility-administered medications on file prior to visit.       Review of patient's allergies indicates:  No Known Allergies    OBJECTIVE:     Vital Signs:  Vitals:    03/30/23 1522   BP: 110/78   BP Location: Left arm   Patient Position: Sitting   BP Method: Large (Manual)   Pulse: 103   Resp: 18   SpO2: 98%   Weight: 52 kg (114 lb 10.2 oz)   Height: 5' 4" (1.626 m)       Body mass index is 19.68 kg/m².     Physical Exam  Constitutional:       General: She is not in acute distress.     Appearance: Normal appearance. She is normal weight. She is not ill-appearing, toxic-appearing or diaphoretic.   HENT:      Head: Normocephalic and atraumatic.   Cardiovascular:      Rate and Rhythm: Tachycardia present.      Pulses: Normal pulses.      Heart sounds: Normal heart sounds. No murmur heard.  Pulmonary:      Effort: Pulmonary effort is normal. No respiratory distress.      Breath sounds: Normal breath sounds. No wheezing.   Abdominal:      General: Abdomen is flat. There is no distension.      Palpations: Abdomen is soft.      Tenderness: There is no abdominal tenderness. "   Musculoskeletal:         General: No swelling.      Right lower leg: No edema.      Left lower leg: No edema.   Skin:     General: Skin is warm and dry.      Coloration: Skin is not jaundiced.      Findings: No bruising.   Neurological:      General: No focal deficit present.      Mental Status: She is alert and oriented to person, place, and time. Mental status is at baseline.   Psychiatric:         Mood and Affect: Mood normal.         Behavior: Behavior normal.     Health Maintenance Due   Topic Date Due    TETANUS VACCINE  Never done    Colorectal Cancer Screening  09/02/2017    COVID-19 Vaccine (4 - Booster for Pfizer series) 12/14/2021    Cervical Cancer Screening  03/28/2022    Influenza Vaccine (1) 09/01/2022    Mammogram  11/19/2022         ASSESSMENT & PLAN:   Ms. Megan Mccall is a 62 y.o. female here today for follow-up. Will shift her long-term medications to 90 day supplies. Further discussed her being on lexapro and Buspar. We will continue with lexapro long term with the goal of weaning off buspar. If symptoms worsening after cessation will refer to psych for medication management. She will follow-up with [facility] regarding mammogram which is due. Provided handout on cologuard testing and will discuss at next appointment. Discussed the importance of tobacco cessation. Will follow-up roughly in 4-6 months.   - Reordered several medications for 90 day supply  - Will continue with lexapro with goal of weaning of buspar, if symptoms reoccur, will refer to psych for medication management   - RTC in 4-6 months   - Will discuss colon screen next appointment, cologuard handout provided      Takotsubo cardiomyopathy  -     atorvastatin (LIPITOR) 80 MG tablet; Take 1 tablet (80 mg total) by mouth once daily.  Dispense: 90 tablet; Refill: 3  -     aspirin 81 MG Chew; Chew and swallow 1 tablet (81 mg total) by mouth once daily.  Dispense: 90 tablet; Refill: 3  -     losartan (COZAAR) 25 MG tablet;  Take ONE-HALF (0.5) tablets (12.5 mg total) by mouth once daily.  Dispense: 45 tablet; Refill: 3  -     metoprolol succinate (TOPROL-XL) 25 MG 24 hr tablet; Take 1 tablet (25 mg total) by mouth once daily.  Dispense: 90 tablet; Refill: 3    Stress-induced cardiomyopathy    Coronary artery disease involving native coronary artery of native heart without angina pectoris  -     atorvastatin (LIPITOR) 80 MG tablet; Take 1 tablet (80 mg total) by mouth once daily.  Dispense: 90 tablet; Refill: 3  -     aspirin 81 MG Chew; Chew and swallow 1 tablet (81 mg total) by mouth once daily.  Dispense: 90 tablet; Refill: 3  -     losartan (COZAAR) 25 MG tablet; Take ONE-HALF (0.5) tablets (12.5 mg total) by mouth once daily.  Dispense: 45 tablet; Refill: 3  -     metoprolol succinate (TOPROL-XL) 25 MG 24 hr tablet; Take 1 tablet (25 mg total) by mouth once daily.  Dispense: 90 tablet; Refill: 3    Tobacco use    Anxiety and depression  -     EScitalopram oxalate (LEXAPRO) 10 MG tablet; Take 1 tablet (10 mg total) by mouth once daily.  Dispense: 90 tablet; Refill: 3        RTC in 4 - 6 months     Discussed with Dr. Tomlinson - staff attestation to follow      Brad Tomlinson DO, MSB   Internal Medicine, PGY-I  [facility]  [address] [place] [address]  [phone]

## 2023-04-03 NOTE — PROGRESS NOTES
Patient's history and physical exam discussed. Please refer to resident physician's note for specific details. I agree with resident's assessment and plan.     Júnior Tomlinson MD  Department of Internal Medicine  [facility]

## 2024-02-16 DIAGNOSIS — I51.81 TAKOTSUBO CARDIOMYOPATHY: ICD-10-CM

## 2024-02-16 DIAGNOSIS — I25.10 CORONARY ARTERY DISEASE INVOLVING NATIVE CORONARY ARTERY OF NATIVE HEART WITHOUT ANGINA PECTORIS: ICD-10-CM

## 2024-02-16 RX ORDER — ATORVASTATIN CALCIUM 80 MG/1
80 TABLET, FILM COATED ORAL DAILY
Qty: 90 TABLET | Refills: 3 | Status: CANCELLED | OUTPATIENT
Start: 2024-02-16 | End: 2025-02-10

## 2024-02-16 RX ORDER — METOPROLOL SUCCINATE 25 MG/1
25 TABLET, EXTENDED RELEASE ORAL DAILY
Qty: 90 TABLET | Refills: 3 | Status: CANCELLED | OUTPATIENT
Start: 2024-02-16 | End: 2025-02-15

## 2024-02-16 RX ORDER — LOSARTAN POTASSIUM 25 MG/1
12.5 TABLET ORAL DAILY
Qty: 45 TABLET | Refills: 3 | Status: CANCELLED | OUTPATIENT
Start: 2024-02-16 | End: 2025-02-10

## 2024-02-16 RX ORDER — NAPROXEN SODIUM 220 MG/1
81 TABLET, FILM COATED ORAL DAILY
Qty: 90 TABLET | Refills: 3 | Status: CANCELLED | OUTPATIENT
Start: 2024-02-16 | End: 2025-02-10

## 2024-02-16 NOTE — TELEPHONE ENCOUNTER
----- Message from Barbara Huggins sent at 2/16/2024  4:11 PM CST -----  Regarding: Refill  Type: RX Refill Request    Who Called:PT    RX Name and Strength:losartan (COZAAR) 25 MG tablet,aspirin 81 MG Chew,atorvastatin (LIPITOR) 80 MG tablet,metoprolol succinate (TOPROL-XL) 25 MG 24 hr tablet    Preferred Pharmacy with phone number: [facility] [address] [place] [address]    Would the patient rather a call back or a response via [facility]?call back     Best Call Back Number:[phone]    Additional Information:Please call once sent

## 2024-02-20 DIAGNOSIS — I51.81 TAKOTSUBO CARDIOMYOPATHY: Primary | ICD-10-CM

## 2024-02-20 DIAGNOSIS — I25.10 CORONARY ARTERY DISEASE INVOLVING NATIVE CORONARY ARTERY OF NATIVE HEART WITHOUT ANGINA PECTORIS: ICD-10-CM

## 2024-02-20 RX ORDER — METOPROLOL SUCCINATE 25 MG/1
25 TABLET, EXTENDED RELEASE ORAL DAILY
Qty: 90 TABLET | Refills: 3 | Status: SHIPPED | OUTPATIENT
Start: 2024-02-20 | End: 2025-02-19

## 2024-02-20 RX ORDER — LOSARTAN POTASSIUM 25 MG/1
12.5 TABLET ORAL DAILY
Qty: 45 TABLET | Refills: 3 | Status: SHIPPED | OUTPATIENT
Start: 2024-02-20 | End: 2024-03-12 | Stop reason: SDUPTHER

## 2024-02-20 RX ORDER — NAPROXEN SODIUM 220 MG/1
81 TABLET, FILM COATED ORAL DAILY
Qty: 90 TABLET | Refills: 3 | Status: SHIPPED | OUTPATIENT
Start: 2024-02-20 | End: 2025-02-14

## 2024-02-20 RX ORDER — ATORVASTATIN CALCIUM 80 MG/1
80 TABLET, FILM COATED ORAL DAILY
Qty: 90 TABLET | Refills: 3 | Status: SHIPPED | OUTPATIENT
Start: 2024-02-20 | End: 2025-02-14

## 2024-02-20 NOTE — TELEPHONE ENCOUNTER
Spoke w/ pt and notified her that the prescription is good until the end of march, and also tried to assist w/ appt, but she has one set up. Pt understood.

## 2024-03-05 ENCOUNTER — TELEPHONE (OUTPATIENT)
Dept: PHARMACY | Facility: CLINIC | Age: 64
End: 2024-03-05
Payer: COMMERCIAL

## 2024-03-12 ENCOUNTER — LAB VISIT (OUTPATIENT)
Dept: LAB | Facility: HOSPITAL | Age: 64
End: 2024-03-12
Payer: COMMERCIAL

## 2024-03-12 ENCOUNTER — OFFICE VISIT (OUTPATIENT)
Dept: INTERNAL MEDICINE | Facility: CLINIC | Age: 64
End: 2024-03-12
Payer: COMMERCIAL

## 2024-03-12 VITALS
OXYGEN SATURATION: 99 % | HEIGHT: 64 IN | BODY MASS INDEX: 20.7 KG/M2 | WEIGHT: 121.25 LBS | SYSTOLIC BLOOD PRESSURE: 150 MMHG | HEART RATE: 84 BPM | DIASTOLIC BLOOD PRESSURE: 90 MMHG

## 2024-03-12 DIAGNOSIS — I10 HYPERTENSION, UNSPECIFIED TYPE: ICD-10-CM

## 2024-03-12 DIAGNOSIS — I25.10 CORONARY ARTERY DISEASE INVOLVING NATIVE CORONARY ARTERY OF NATIVE HEART WITHOUT ANGINA PECTORIS: ICD-10-CM

## 2024-03-12 DIAGNOSIS — I51.81 TAKOTSUBO CARDIOMYOPATHY: ICD-10-CM

## 2024-03-12 DIAGNOSIS — F32.A CHRONIC DEPRESSION: ICD-10-CM

## 2024-03-12 DIAGNOSIS — H35.30 MACULAR DEGENERATION OF RIGHT EYE, UNSPECIFIED TYPE: Primary | ICD-10-CM

## 2024-03-12 DIAGNOSIS — F32.A ANXIETY AND DEPRESSION: ICD-10-CM

## 2024-03-12 DIAGNOSIS — F41.9 ANXIETY AND DEPRESSION: ICD-10-CM

## 2024-03-12 DIAGNOSIS — Z72.0 TOBACCO USE: ICD-10-CM

## 2024-03-12 LAB
ANION GAP SERPL CALC-SCNC: 9 MMOL/L (ref 8–16)
BASOPHILS # BLD AUTO: 0.06 K/UL (ref 0–0.2)
BASOPHILS NFR BLD: 0.8 % (ref 0–1.9)
BUN SERPL-MCNC: 14 MG/DL (ref 8–23)
CALCIUM SERPL-MCNC: 10.1 MG/DL (ref 8.7–10.5)
CHLORIDE SERPL-SCNC: 106 MMOL/L (ref 95–110)
CHOLEST SERPL-MCNC: 151 MG/DL (ref 120–199)
CHOLEST/HDLC SERPL: 2.4 {RATIO} (ref 2–5)
CO2 SERPL-SCNC: 27 MMOL/L (ref 23–29)
CREAT SERPL-MCNC: 0.8 MG/DL (ref 0.5–1.4)
DIFFERENTIAL METHOD BLD: ABNORMAL
EOSINOPHIL # BLD AUTO: 0.2 K/UL (ref 0–0.5)
EOSINOPHIL NFR BLD: 2.6 % (ref 0–8)
ERYTHROCYTE [DISTWIDTH] IN BLOOD BY AUTOMATED COUNT: 13 % (ref 11.5–14.5)
EST. GFR  (NO RACE VARIABLE): >60 ML/MIN/1.73 M^2
GLUCOSE SERPL-MCNC: 90 MG/DL (ref 70–110)
HCT VFR BLD AUTO: 44.3 % (ref 37–48.5)
HDLC SERPL-MCNC: 63 MG/DL (ref 40–75)
HDLC SERPL: 41.7 % (ref 20–50)
HGB BLD-MCNC: 14.1 G/DL (ref 12–16)
IMM GRANULOCYTES # BLD AUTO: 0.02 K/UL (ref 0–0.04)
IMM GRANULOCYTES NFR BLD AUTO: 0.3 % (ref 0–0.5)
LDLC SERPL CALC-MCNC: 70.2 MG/DL (ref 63–159)
LYMPHOCYTES # BLD AUTO: 1.9 K/UL (ref 1–4.8)
LYMPHOCYTES NFR BLD: 25.9 % (ref 18–48)
MCH RBC QN AUTO: 31.9 PG (ref 27–31)
MCHC RBC AUTO-ENTMCNC: 31.8 G/DL (ref 32–36)
MCV RBC AUTO: 100 FL (ref 82–98)
MONOCYTES # BLD AUTO: 0.4 K/UL (ref 0.3–1)
MONOCYTES NFR BLD: 5.8 % (ref 4–15)
NEUTROPHILS # BLD AUTO: 4.8 K/UL (ref 1.8–7.7)
NEUTROPHILS NFR BLD: 64.6 % (ref 38–73)
NONHDLC SERPL-MCNC: 88 MG/DL
NRBC BLD-RTO: 0 /100 WBC
PLATELET # BLD AUTO: 263 K/UL (ref 150–450)
PMV BLD AUTO: 10.2 FL (ref 9.2–12.9)
POTASSIUM SERPL-SCNC: 3.9 MMOL/L (ref 3.5–5.1)
RBC # BLD AUTO: 4.42 M/UL (ref 4–5.4)
SODIUM SERPL-SCNC: 142 MMOL/L (ref 136–145)
TRIGL SERPL-MCNC: 89 MG/DL (ref 30–150)
WBC # BLD AUTO: 7.42 K/UL (ref 3.9–12.7)

## 2024-03-12 PROCEDURE — 4010F ACE/ARB THERAPY RXD/TAKEN: CPT | Mod: CPTII,S$GLB,,

## 2024-03-12 PROCEDURE — 3079F DIAST BP 80-89 MM HG: CPT | Mod: CPTII,S$GLB,,

## 2024-03-12 PROCEDURE — 80061 LIPID PANEL: CPT

## 2024-03-12 PROCEDURE — 36415 COLL VENOUS BLD VENIPUNCTURE: CPT

## 2024-03-12 PROCEDURE — 3008F BODY MASS INDEX DOCD: CPT | Mod: CPTII,S$GLB,,

## 2024-03-12 PROCEDURE — 99214 OFFICE O/P EST MOD 30 MIN: CPT | Mod: S$GLB,,,

## 2024-03-12 PROCEDURE — 80048 BASIC METABOLIC PNL TOTAL CA: CPT

## 2024-03-12 PROCEDURE — 85025 COMPLETE CBC W/AUTO DIFF WBC: CPT

## 2024-03-12 PROCEDURE — 3077F SYST BP >= 140 MM HG: CPT | Mod: CPTII,S$GLB,,

## 2024-03-12 PROCEDURE — 99999 PR PBB SHADOW E&M-EST. PATIENT-LVL IV: CPT | Mod: PBBFAC,,,

## 2024-03-12 RX ORDER — LOSARTAN POTASSIUM 25 MG/1
25 TABLET ORAL DAILY
Qty: 90 TABLET | Refills: 3 | Status: SHIPPED | OUTPATIENT
Start: 2024-03-12 | End: 2025-03-07

## 2024-03-12 NOTE — PROGRESS NOTES
Subjective     Chief Complaint: Follow-up     History of Present Illness:  Ms. Megan Mccall is a 63 y.o. female with hx of takotsubo cardiomyopathy, HTN, CAD, and smoking here for follow-up. She recently saw her optometrist who expressed concerns for macula hole of the R eye. She has recently noticed increased BP SBP 150s-160s. She has recently joined a smoking cessation program. Has not seen cardiology since late 2022. Last labs late 2022. She overall feels well. Stopped taking lexapro and is now back on combo welbutrin and buspar as she feels this works better for her. Has not seen psychiatry.     Review of Systems   Eyes:  Positive for blurred vision (altered R eye).   Respiratory:  Negative for shortness of breath and wheezing.    Cardiovascular:  Negative for chest pain and leg swelling.   Gastrointestinal: Negative.    Genitourinary: Negative.    Musculoskeletal: Negative.    Neurological: Negative.    Psychiatric/Behavioral:  Positive for depression.      PAST HISTORY:     Past Medical History:   Diagnosis Date    Anxiety     Depression     Hyperplastic colon polyp     Takotsubo cardiomyopathy     Tobacco abuse        Past Surgical History:   Procedure Laterality Date    ANGIOGRAM, CORONARY, WITH LEFT HEART CATHETERIZATION N/A 12/5/2022    Procedure: Angiogram, Coronary, with Left Heart Cath;  Surgeon: Gerardo Reynolds Jr., MD;  Location: [facility] CATH LAB;  Service: Cardiology;  Laterality: N/A;    NONE         Family History   Problem Relation Age of Onset    Heart disease Father     Heart attack Father 59    Diabetes Paternal Grandmother        Social History     Tobacco Use    Smoking status: Every Day     Current packs/day: 0.67     Average packs/day: 0.7 packs/day for 31.5 years (21.1 ttl pk-yrs)     Types: Cigarettes     Start date: 8/25/1992    Smokeless tobacco: Never   Substance Use Topics    Alcohol use: Yes     Alcohol/week: 18.0 standard drinks of alcohol     Types: 18 Cans of beer per week      "Comment: daily    Drug use: No       MEDICATIONS & ALLERGIES:     Current Outpatient Medications on File Prior to Visit   Medication Sig    aspirin 81 MG Chew Chew and swallow 1 tablet (81 mg total) by mouth once daily.    atorvastatin (LIPITOR) 80 MG tablet Take 1 tablet (80 mg total) by mouth once daily.    busPIRone (BUSPAR) 15 MG tablet Take 15 mg by mouth 3 (three) times daily.    metoprolol succinate (TOPROL-XL) 25 MG 24 hr tablet Take 1 tablet (25 mg total) by mouth once daily.    pneumoc 20-guillaume conj-dip cr,PF, (PREVNAR-20, PF,) 0.5 mL Syrg injection Inject 0.5 mLs into the muscle.    [DISCONTINUED] EScitalopram oxalate (LEXAPRO) 10 MG tablet Take 1 tablet (10 mg total) by mouth once daily.    [DISCONTINUED] losartan (COZAAR) 25 MG tablet Take ONE-HALF (0.5) tablets (12.5 mg total) by mouth once daily.     No current facility-administered medications on file prior to visit.       Review of patient's allergies indicates:  No Known Allergies    OBJECTIVE:     Vital Signs:  Vitals:    03/12/24 1529   BP: (!) 140/87   BP Location: Right arm   Patient Position: Sitting   BP Method: Large (Automatic)   Pulse: 84   SpO2: 99%   Weight: 55 kg (121 lb 4.1 oz)   Height: 5' 4" (1.626 m)       Body mass index is 20.81 kg/m².     Physical Exam  Constitutional:       General: She is not in acute distress.     Appearance: Normal appearance. She is normal weight. She is not ill-appearing, toxic-appearing or diaphoretic.   HENT:      Head: Normocephalic and atraumatic.   Cardiovascular:      Rate and Rhythm: Normal rate and regular rhythm.      Pulses: Normal pulses.      Heart sounds: Normal heart sounds. No murmur heard.  Pulmonary:      Effort: Pulmonary effort is normal. No respiratory distress.      Breath sounds: Normal breath sounds. No wheezing.   Abdominal:      General: There is no distension.      Tenderness: There is no abdominal tenderness.   Skin:     General: Skin is warm and dry.   Neurological:      General: " No focal deficit present.      Mental Status: She is alert and oriented to person, place, and time.   Psychiatric:         Mood and Affect: Mood normal.         Behavior: Behavior normal.     Health Maintenance Due   Topic Date Due    TETANUS VACCINE  Never done    LDCT Lung Screen  Never done    Colorectal Cancer Screening  09/02/2017    RSV Vaccine (Age 60+ and Pregnant patients) (1 - 1-dose 60+ series) Never done    Cervical Cancer Screening  03/28/2022    Influenza Vaccine (1) 09/01/2023    COVID-19 Vaccine (4 - 2023-24 season) 09/01/2023         ASSESSMENT & PLAN:   Ms. Megan Mccall is a 63 y.o. female here for follow up. Will get new CBC, BMP, and lipid panel. LDCT scan for smoking screen placed. Referral for psych and optho placed. Increased losartan to 25mg daily up from 12.5mg daily. Patient to send us home BP readings after uptitration. Will follow-up labs when completed.     Macular degeneration of right eye, unspecified type  -     Ambulatory referral/consult to Ophthalmology; Future; Expected date: 03/19/2024    Tobacco use  -     CT Chest Lung Screening Low Dose; Future; Expected date: 03/12/2024    Hypertension, unspecified type  -     BASIC METABOLIC PANEL; Future; Expected date: 03/12/2024    Chronic depression    Coronary artery disease involving native coronary artery of native heart without angina pectoris  -     CBC W/ AUTO DIFFERENTIAL; Future; Expected date: 03/12/2024  -     BASIC METABOLIC PANEL; Future; Expected date: 03/12/2024  -     LIPID PANEL; Future; Expected date: 03/12/2024  -     losartan (COZAAR) 25 MG tablet; Take 1 tablet (25 mg total) by mouth once daily.  Dispense: 90 tablet; Refill: 3    Anxiety and depression  -     Ambulatory referral/consult to Psychiatry; Future; Expected date: 03/19/2024    Takotsubo cardiomyopathy  -     losartan (COZAAR) 25 MG tablet; Take 1 tablet (25 mg total) by mouth once daily.  Dispense: 90 tablet; Refill: 3        RTC in 6 months or sooner PRN      Discussed with Dr. Beauchamp - staff attestation to follow      Brad Tomlinson DO, MSB   Internal Medicine, PGY-2  [facility]  [address] [place] [id number] [phone]    I have discussed A/P with DR Tomlinson  and agree with plan of action.  Dian Giles.

## 2024-03-19 ENCOUNTER — TELEPHONE (OUTPATIENT)
Dept: OPHTHALMOLOGY | Facility: CLINIC | Age: 64
End: 2024-03-19

## 2024-03-19 ENCOUNTER — OFFICE VISIT (OUTPATIENT)
Dept: OPHTHALMOLOGY | Facility: CLINIC | Age: 64
End: 2024-03-19
Payer: COMMERCIAL

## 2024-03-19 DIAGNOSIS — H35.341 LAMELLAR MACULAR HOLE OF RIGHT EYE: Primary | ICD-10-CM

## 2024-03-19 DIAGNOSIS — H35.371 EPIRETINAL MEMBRANE, RIGHT: ICD-10-CM

## 2024-03-19 DIAGNOSIS — H35.341 FULL THICKNESS MACULAR HOLE, RIGHT: Primary | ICD-10-CM

## 2024-03-19 DIAGNOSIS — H35.371 RIGHT EPIRETINAL MEMBRANE: ICD-10-CM

## 2024-03-19 DIAGNOSIS — H35.30 MACULAR DEGENERATION OF RIGHT EYE, UNSPECIFIED TYPE: ICD-10-CM

## 2024-03-19 PROCEDURE — 92201 OPSCPY EXTND RTA DRAW UNI/BI: CPT | Mod: S$GLB,,, | Performed by: OPHTHALMOLOGY

## 2024-03-19 PROCEDURE — 92134 CPTRZ OPH DX IMG PST SGM RTA: CPT | Mod: S$GLB,,, | Performed by: OPHTHALMOLOGY

## 2024-03-19 PROCEDURE — 1159F MED LIST DOCD IN RCRD: CPT | Mod: CPTII,S$GLB,, | Performed by: OPHTHALMOLOGY

## 2024-03-19 PROCEDURE — 1160F RVW MEDS BY RX/DR IN RCRD: CPT | Mod: CPTII,S$GLB,, | Performed by: OPHTHALMOLOGY

## 2024-03-19 PROCEDURE — 99999 PR PBB SHADOW E&M-EST. PATIENT-LVL III: CPT | Mod: PBBFAC,,, | Performed by: OPHTHALMOLOGY

## 2024-03-19 PROCEDURE — 92004 COMPRE OPH EXAM NEW PT 1/>: CPT | Mod: S$GLB,,, | Performed by: OPHTHALMOLOGY

## 2024-03-19 PROCEDURE — 4010F ACE/ARB THERAPY RXD/TAKEN: CPT | Mod: CPTII,S$GLB,, | Performed by: OPHTHALMOLOGY

## 2024-03-19 RX ORDER — ESCITALOPRAM OXALATE 20 MG/1
20 TABLET ORAL
COMMUNITY
End: 2024-05-28

## 2024-03-19 RX ORDER — BUPROPION HYDROCHLORIDE 150 MG/1
150 TABLET, EXTENDED RELEASE ORAL 2 TIMES DAILY
COMMUNITY
Start: 2023-10-10 | End: 2024-04-04 | Stop reason: SDUPTHER
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
## 2024-03-26 ENCOUNTER — CLINICAL SUPPORT (OUTPATIENT)
Dept: SMOKING CESSATION | Facility: CLINIC | Age: 64
End: 2024-03-26
Payer: COMMERCIAL

## 2024-03-26 DIAGNOSIS — F17.200 NICOTINE DEPENDENCE: Primary | ICD-10-CM

## 2024-03-26 PROCEDURE — 99999 PR PBB SHADOW E&M-EST. PATIENT-LVL II: CPT | Mod: PBBFAC,,, | Performed by: DIETITIAN, REGISTERED

## 2024-03-26 PROCEDURE — 99404 PREV MED CNSL INDIV APPRX 60: CPT | Mod: S$GLB,,, | Performed by: DIETITIAN, REGISTERED

## 2024-03-26 RX ORDER — IBUPROFEN 200 MG
1 TABLET ORAL DAILY
Qty: 14 PATCH | Refills: 0 | Status: SHIPPED | OUTPATIENT
Start: 2024-03-26 | End: 2024-04-10 | Stop reason: SDUPTHER

## 2024-03-26 NOTE — PROGRESS NOTES
Patient is still ambivalent about quitting smoking, but agreed to be participating in biweekly tobacco cessation meetings and will begin the prescribed tobacco cessation medication regimen of  21 mg nicotine patch QD .  Patient currently smokes 20 self-rolled cigarettes per day.  Pt's exhaled carbon monoxide level was 16 ppm as per Smoker[employer]zer. (non- smoker = 0-5 ppm.)   Discussed the role of tobacco cessation program, role of nicotine replacement therapy  (NRT) and behavioral changes to assist the patient to reach her goal of being tobacco free.   Education and instruction on the role of the NRT, usage and  proper placement of the patch. Patient verbalized understanding and willingness to use patch. Patient understands she can call CTTS at any time.

## 2024-04-04 DIAGNOSIS — F32.A CHRONIC DEPRESSION: ICD-10-CM

## 2024-04-04 DIAGNOSIS — F41.9 ANXIETY AND DEPRESSION: Primary | ICD-10-CM

## 2024-04-04 DIAGNOSIS — F32.A ANXIETY AND DEPRESSION: Primary | ICD-10-CM

## 2024-04-04 RX ORDER — BUSPIRONE HYDROCHLORIDE 15 MG/1
15 TABLET ORAL 3 TIMES DAILY
Qty: 90 TABLET | Refills: 5 | Status: SHIPPED | OUTPATIENT
Start: 2024-04-04

## 2024-04-04 RX ORDER — BUPROPION HYDROCHLORIDE 150 MG/1
150 TABLET, EXTENDED RELEASE ORAL 2 TIMES DAILY
Qty: 60 TABLET | Refills: 5 | Status: SHIPPED | OUTPATIENT
Start: 2024-04-04

## 2024-04-04 NOTE — TELEPHONE ENCOUNTER
Continued prior psych medications Wellbutrin 150mg BID with buspar 15mg TID. Patient has tolerated medication combo in the past and currently back on it with her older script. Called and states has 4 days left. Will see for annual in 3-5 months.

## 2024-04-10 ENCOUNTER — CLINICAL SUPPORT (OUTPATIENT)
Dept: SMOKING CESSATION | Facility: CLINIC | Age: 64
End: 2024-04-10

## 2024-04-10 DIAGNOSIS — F17.200 NICOTINE DEPENDENCE: Primary | ICD-10-CM

## 2024-04-10 PROCEDURE — 99999 PR PBB SHADOW E&M-EST. PATIENT-LVL II: CPT | Mod: PBBFAC,,, | Performed by: DIETITIAN, REGISTERED

## 2024-04-10 RX ORDER — IBUPROFEN 200 MG
1 TABLET ORAL DAILY
Qty: 28 PATCH | Refills: 0 | Status: SHIPPED | OUTPATIENT
Start: 2024-04-10 | End: 2024-05-28 | Stop reason: SDUPTHER

## 2024-04-10 NOTE — PROGRESS NOTES
"Individual Follow-Up Form    4/10/2024    Quit Date: ---    Clinical Status of Patient: Outpatient    Length of Service: 45 minutes    Continuing Medication: yes  Patches    Other Medications: wellbutrin SR (prescribed by PCP)     Target Symptoms: Withdrawal and medication side effects. The following were  rated moderate (3) to severe (4) on TCRS:  Moderate (3): crave/desire  Severe (4): none    Comments: Patient presents in clinic today for follow up smoking ~10 cigarettes per day. Pt is unsure as she rolls her own cigarettes right before smoking them and currently does not track daily cigarettes. Pt remains on tobacco cessation medication of  21 mg nicotine patch QD and states she is consistent with use. No adverse effects noted at this time. Pt doing well with rate reduction and wait times prior to smoking.  Pt states she feels the patches are working for her as she knows she is smoking "about half of what [she] was smoking before" and knows this since her loose tobacco is lasting her twice as long. Reviewed coping strategies/habitual behaviors with patient. Pt admits she is still ambivalent about quitting, yet states "it would be nice to quit". Encouraged pt to keep track of daily cigarettes in a small business card-sized paper. Pt verbalized understanding. Will re-fill patches Rx and follow up telephonically in 2.5 weeks per pt request. Patient understands she can call CTTS at any time.       Diagnosis: F17.200    Next Visit: 2 weeks    "

## 2024-04-29 ENCOUNTER — TELEPHONE (OUTPATIENT)
Dept: SMOKING CESSATION | Facility: CLINIC | Age: 64
End: 2024-04-29
Payer: COMMERCIAL

## 2024-04-29 NOTE — TELEPHONE ENCOUNTER
Smoking Cessation clinic - called pt at 3:30 pm and again at 3:35 pm for TELEPHONIC follow up appt per request. Pt did not answer phone either time. Left voicemails with CTTS' contact info for pt to call back for progress update and to reschedule.  Master Morejon, [facility]  [phone]

## 2024-05-22 ENCOUNTER — TELEPHONE (OUTPATIENT)
Dept: SMOKING CESSATION | Facility: CLINIC | Age: 64
End: 2024-05-22
Payer: COMMERCIAL

## 2024-05-22 NOTE — TELEPHONE ENCOUNTER
Smoking Cessation clinic - called pt for progress update and to reschedule last missed PHONE follow up appt. Spoke w pt and rescheduled follow up appt for this coming Tuesday 5/28/24 at 3:30pm.  Master Morejon, [facility]  [phone]

## 2024-05-28 ENCOUNTER — CLINICAL SUPPORT (OUTPATIENT)
Dept: SMOKING CESSATION | Facility: CLINIC | Age: 64
End: 2024-05-28
Payer: COMMERCIAL

## 2024-05-28 ENCOUNTER — OFFICE VISIT (OUTPATIENT)
Dept: PSYCHIATRY | Facility: CLINIC | Age: 64
End: 2024-05-28
Payer: COMMERCIAL

## 2024-05-28 VITALS
WEIGHT: 113.19 LBS | BODY MASS INDEX: 19.43 KG/M2 | SYSTOLIC BLOOD PRESSURE: 118 MMHG | DIASTOLIC BLOOD PRESSURE: 54 MMHG | HEART RATE: 82 BPM

## 2024-05-28 DIAGNOSIS — F17.200 NICOTINE DEPENDENCE: Primary | ICD-10-CM

## 2024-05-28 DIAGNOSIS — F41.9 ANXIETY AND DEPRESSION: ICD-10-CM

## 2024-05-28 DIAGNOSIS — F32.A CHRONIC DEPRESSION: ICD-10-CM

## 2024-05-28 DIAGNOSIS — F33.1 MODERATE EPISODE OF RECURRENT MAJOR DEPRESSIVE DISORDER: ICD-10-CM

## 2024-05-28 DIAGNOSIS — F32.A ANXIETY AND DEPRESSION: ICD-10-CM

## 2024-05-28 DIAGNOSIS — F41.1 GAD (GENERALIZED ANXIETY DISORDER): Primary | ICD-10-CM

## 2024-05-28 PROCEDURE — 90792 PSYCH DIAG EVAL W/MED SRVCS: CPT | Mod: S$GLB,,,

## 2024-05-28 PROCEDURE — 1159F MED LIST DOCD IN RCRD: CPT | Mod: CPTII,S$GLB,,

## 2024-05-28 PROCEDURE — 1160F RVW MEDS BY RX/DR IN RCRD: CPT | Mod: CPTII,S$GLB,,

## 2024-05-28 PROCEDURE — 4010F ACE/ARB THERAPY RXD/TAKEN: CPT | Mod: CPTII,S$GLB,,

## 2024-05-28 PROCEDURE — 3074F SYST BP LT 130 MM HG: CPT | Mod: CPTII,S$GLB,,

## 2024-05-28 PROCEDURE — 99999 PR PBB SHADOW E&M-EST. PATIENT-LVL III: CPT | Mod: PBBFAC,,,

## 2024-05-28 PROCEDURE — 99999 PR PBB SHADOW E&M-EST. PATIENT-LVL II: CPT | Mod: PBBFAC,,, | Performed by: DIETITIAN, REGISTERED

## 2024-05-28 PROCEDURE — 3078F DIAST BP <80 MM HG: CPT | Mod: CPTII,S$GLB,,

## 2024-05-28 RX ORDER — TRAZODONE HYDROCHLORIDE 50 MG/1
TABLET ORAL
Qty: 30 TABLET | Refills: 1 | Status: SHIPPED | OUTPATIENT
Start: 2024-05-28

## 2024-05-28 RX ORDER — IBUPROFEN 200 MG
1 TABLET ORAL DAILY
Qty: 14 PATCH | Refills: 0 | Status: SHIPPED | OUTPATIENT
Start: 2024-05-28

## 2024-05-28 NOTE — PROGRESS NOTES
"Individual Follow-Up Form    5/28/2024    Quit Date: ---    Clinical Status of Patient: Outpatient    Length of Service: 60 minutes    Continuing Medication: yes  Patches    Other Medications: wellbutrin SR BID (prescribed by PCP)     Target Symptoms: Withdrawal and medication side effects. The following were  rated moderate (3) to severe (4) on TCRS:  Moderate (3): crave/desire  Severe (4): none    Comments: Patient presents in clinic today for follow up smoking around 10 cigarettes per day. Pt remains on tobacco cessation medication of  21 mg nicotine patch QD and shares she is consistent with its use. No adverse effects noted at this time. Pt doing well with rate reduction and wait times prior to smoking.  Pt states she is finally done with [employer] activities and is looking forward to having a little more for herself. 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 Pt is cristina about not making much progress toward her quit goal, but feels she has more energy to focus on this goal now that [employer] is over. Encouraged pt to continue working towards her goal of becoming quit. Discussed session packet on "Managing Behaviors" and pt verbalized understanding. Reviewed coping strategies/habitual behaviors with patient. Patient understands she can call CTTS at any time.       Diagnosis: F17.200    Next Visit: 2 weeks    "
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
## 2024-06-11 ENCOUNTER — TELEPHONE (OUTPATIENT)
Dept: SMOKING CESSATION | Facility: CLINIC | Age: 64
End: 2024-06-11
Payer: COMMERCIAL

## 2024-06-11 NOTE — TELEPHONE ENCOUNTER
Smoking Cessation clinic - called pt for progress update and to reschedule today's cancelled in-clinic follow up appt. Left voicemail with CTTS' contact info for pt to call back.  Master Morejon, [facility]  [phone]

## 2024-06-17 ENCOUNTER — TELEPHONE (OUTPATIENT)
Dept: SMOKING CESSATION | Facility: CLINIC | Age: 64
End: 2024-06-17
Payer: COMMERCIAL

## 2024-06-17 NOTE — TELEPHONE ENCOUNTER
"Smoking Cessation clinic - called pt for progress update and to reschedule last week's cancelled in-clinic follow up appt. Unable to leave voicemail with CTTS' contact info for pt to call back as pt's cell phone mailbox "is full and cannot accept any new messages at this time".  Master Morejon, [facility]  [phone]   "

## 2024-06-20 ENCOUNTER — TELEPHONE (OUTPATIENT)
Dept: OPTOMETRY | Facility: CLINIC | Age: 64
End: 2024-06-20
Payer: COMMERCIAL

## 2024-06-20 DIAGNOSIS — H35.341 FULL THICKNESS MACULAR HOLE, RIGHT: Primary | ICD-10-CM

## 2024-06-20 NOTE — TELEPHONE ENCOUNTER
----- Message from bAi Downey sent at 6/20/2024 10:53 AM CDT -----  Pt calling to reschedule her procedure date , please call       Confirmed patient's contact info below:  Contact Name: Megan Mccall  Phone Number: [phone]

## 2024-06-27 ENCOUNTER — CLINICAL SUPPORT (OUTPATIENT)
Dept: SMOKING CESSATION | Facility: CLINIC | Age: 64
End: 2024-06-27

## 2024-06-27 DIAGNOSIS — F17.200 NICOTINE DEPENDENCE: Primary | ICD-10-CM

## 2024-06-27 PROCEDURE — 99999 PR PBB SHADOW E&M-EST. PATIENT-LVL II: CPT | Mod: PBBFAC,,, | Performed by: DIETITIAN, REGISTERED

## 2024-07-05 ENCOUNTER — PATIENT MESSAGE (OUTPATIENT)
Dept: PSYCHIATRY | Facility: CLINIC | Age: 64
End: 2024-07-05
Payer: COMMERCIAL

## 2024-07-15 NOTE — PRE-PROCEDURE INSTRUCTIONS
PREOP INSTRUCTIONS:  No food,milk or milk products for 8 hours before surgery.  Clear liquids like water,gatorade,apple juice are allowed up until 2 hours before surgery.  Instructed to follow the surgeon's instructions if they differ from these.  Shower instructions as well as directions to the Surgery Center were given.  Encouraged to wear loose fitting,comfortable clothing.  Medication instructions for pm prior to and am of procedure reviewed.  Instructed to avoid taking vitamins,supplements,aspirin and ibuprofen the morning of surgery.    Patient denies any side effects or issues with anesthesia or sedation.     Patient does not know arrival time.Explained that this information comes from the surgeon's office and if they haven't heard from them by 2 or 3 pm 7/16/2024 to call the office.Patient stated an understanding.

## 2024-07-16 ENCOUNTER — TELEPHONE (OUTPATIENT)
Dept: OPHTHALMOLOGY | Facility: CLINIC | Age: 64
End: 2024-07-16
Payer: COMMERCIAL

## 2024-07-16 NOTE — H&P
Pre-Operative History & Physical  Ophthalmology      SUBJECTIVE:     History of Present Illness:  Patient is a 63 y.o. female presents with Full thickness macular hole, right [H35.341].    MEDICATIONS:   No medications prior to admission.       ALLERGIES: Review of patient's allergies indicates:  No Known Allergies    PAST MEDICAL HISTORY:   Past Medical History:   Diagnosis Date    Anxiety     Depression     Hyperplastic colon polyp     Takotsubo cardiomyopathy     Tobacco abuse      PAST SURGICAL HISTORY:   Past Surgical History:   Procedure Laterality Date    ANGIOGRAM, CORONARY, WITH LEFT HEART CATHETERIZATION N/A 12/5/2022    Procedure: Angiogram, Coronary, with Left Heart Cath;  Surgeon: Gerardo Reynolds Jr., MD;  Location: [facility] CATH LAB;  Service: Cardiology;  Laterality: N/A;    NONE       PAST FAMILY HISTORY:   Family History   Problem Relation Name Age of Onset    Heart disease Father      Heart attack Father  59    Diabetes Paternal Grandmother      Amblyopia Neg Hx      Blindness Neg Hx      Cataracts Neg Hx      Glaucoma Neg Hx      Macular degeneration Neg Hx      Retinal detachment Neg Hx      Strabismus Neg Hx       SOCIAL HISTORY:   Social History     Tobacco Use    Smoking status: Every Day     Current packs/day: 1.00     Average packs/day: 0.9 packs/day for 31.9 years (28.5 ttl pk-yrs)     Types: Cigarettes     Start date: 8/25/1992    Smokeless tobacco: Never    Tobacco comments:     Currently enrolled in smoking cessation program. Rolls her own cigarettes   Substance Use Topics    Alcohol use: Yes     Alcohol/week: 18.0 standard drinks of alcohol     Types: 18 Cans of beer per week     Comment: daily 3-4 beers a day    Drug use: No        MENTAL STATUS: Alert    REVIEW OF SYSTEMS: Negative    OBJECTIVE:     Vital Signs (Most Recent)       Physical Exam:  General: NAD  HEENT: AT/NC, FTMH OD with ERM  Lungs: Adequate respirations  Heart: + pulses  Abdomen: Soft    ASSESSMENT/PLAN:     Patient is  a 63 y.o. female with Full thickness macular hole, right [H35.341]      - Risks/benefits/alternatives of the procedure including, but not limited to, infection, bleeding, pain, ptosis, macular edema, corneal edema, persistent corneal defect, retinal tears, retinal detachment, epiretinal membrane, elevated intraocular pressure, hypotony, cataract formation, possible need for strict post-op head positioning, possible temporary avoidance of air travel, loss of vision, loss of the eye, paralysis, and death were discussed with the patient and/or family. The patient/family voiced good understanding, the informed consent was signed, witnessed, and placed in chart. All patient and family questions were answered.   - Will proceed with 25g PPV/ILM peel with flap/SF6 OD   - Plan for MAC with retrobulbar block anesthesia   - Allergies reviewed: Review of patient's allergies indicates:  No Known Allergies    Stephanie Swann MD  [employer] Ophthalmology PGY-2

## 2024-07-17 ENCOUNTER — ANESTHESIA (OUTPATIENT)
Dept: SURGERY | Facility: HOSPITAL | Age: 64
End: 2024-07-17
Payer: COMMERCIAL

## 2024-07-17 ENCOUNTER — ANESTHESIA EVENT (OUTPATIENT)
Dept: SURGERY | Facility: HOSPITAL | Age: 64
End: 2024-07-17
Payer: COMMERCIAL

## 2024-07-17 ENCOUNTER — HOSPITAL ENCOUNTER (OUTPATIENT)
Facility: HOSPITAL | Age: 64
Discharge: HOME | End: 2024-07-17
Attending: OPHTHALMOLOGY | Admitting: OPHTHALMOLOGY
Payer: COMMERCIAL

## 2024-07-17 VITALS
HEART RATE: 70 BPM | RESPIRATION RATE: 19 BRPM | BODY MASS INDEX: 19.97 KG/M2 | TEMPERATURE: 98 F | SYSTOLIC BLOOD PRESSURE: 127 MMHG | DIASTOLIC BLOOD PRESSURE: 55 MMHG | OXYGEN SATURATION: 96 % | WEIGHT: 117 LBS | HEIGHT: 64 IN

## 2024-07-17 DIAGNOSIS — H35.341 FULL THICKNESS MACULAR HOLE, RIGHT: Primary | ICD-10-CM

## 2024-07-17 DIAGNOSIS — H35.371 EPIRETINAL MEMBRANE (ERM) OF RIGHT EYE: ICD-10-CM

## 2024-07-17 DIAGNOSIS — H35.371 EPIRETINAL MEMBRANE, RIGHT: ICD-10-CM

## 2024-07-17 PROCEDURE — C1784 OCULAR DEV, INTRAOP, DET RET: HCPCS | Performed by: OPHTHALMOLOGY

## 2024-07-17 PROCEDURE — 71000015 HC POSTOP RECOV 1ST HR: Performed by: OPHTHALMOLOGY

## 2024-07-17 PROCEDURE — 37000008 HC ANESTHESIA 1ST 15 MINUTES: Performed by: OPHTHALMOLOGY

## 2024-07-17 PROCEDURE — 37000009 HC ANESTHESIA EA ADD 15 MINS: Performed by: OPHTHALMOLOGY

## 2024-07-17 PROCEDURE — 36000707: Performed by: OPHTHALMOLOGY

## 2024-07-17 PROCEDURE — 71000016 HC POSTOP RECOV ADDL HR: Performed by: OPHTHALMOLOGY

## 2024-07-17 PROCEDURE — 67042 VIT FOR MACULAR HOLE: CPT | Mod: RT,,, | Performed by: OPHTHALMOLOGY

## 2024-07-17 PROCEDURE — 25000003 PHARM REV CODE 250: Performed by: OPHTHALMOLOGY

## 2024-07-17 PROCEDURE — 71000044 HC DOSC ROUTINE RECOVERY FIRST HOUR: Performed by: OPHTHALMOLOGY

## 2024-07-17 PROCEDURE — 25000003 PHARM REV CODE 250: Performed by: REGISTERED NURSE

## 2024-07-17 PROCEDURE — 25000003 PHARM REV CODE 250

## 2024-07-17 PROCEDURE — 63600175 PHARM REV CODE 636 W HCPCS: Performed by: OPHTHALMOLOGY

## 2024-07-17 PROCEDURE — 27201423 OPTIME MED/SURG SUP & DEVICES STERILE SUPPLY: Performed by: OPHTHALMOLOGY

## 2024-07-17 PROCEDURE — 36000706: Performed by: OPHTHALMOLOGY

## 2024-07-17 PROCEDURE — 63600175 PHARM REV CODE 636 W HCPCS: Performed by: REGISTERED NURSE

## 2024-07-17 RX ORDER — LIDOCAINE HYDROCHLORIDE 20 MG/ML
INJECTION, SOLUTION EPIDURAL; INFILTRATION; INTRACAUDAL; PERINEURAL
Status: DISCONTINUED
Start: 2024-07-17 | End: 2024-07-17 | Stop reason: HOSPADM

## 2024-07-17 RX ORDER — LIDOCAINE HYDROCHLORIDE 10 MG/ML
INJECTION, SOLUTION EPIDURAL; INFILTRATION; INTRACAUDAL; PERINEURAL
Status: DISCONTINUED | OUTPATIENT
Start: 2024-07-17 | End: 2024-07-17 | Stop reason: HOSPADM

## 2024-07-17 RX ORDER — MIDAZOLAM HYDROCHLORIDE 1 MG/ML
INJECTION INTRAMUSCULAR; INTRAVENOUS
Status: DISCONTINUED | OUTPATIENT
Start: 2024-07-17 | End: 2024-07-17

## 2024-07-17 RX ORDER — INDOCYANINE GREEN AND WATER 25 MG
KIT INJECTION
Status: DISCONTINUED | OUTPATIENT
Start: 2024-07-17 | End: 2024-07-17 | Stop reason: HOSPADM

## 2024-07-17 RX ORDER — BUPIVACAINE HYDROCHLORIDE 7.5 MG/ML
INJECTION, SOLUTION EPIDURAL; RETROBULBAR
Status: DISCONTINUED | OUTPATIENT
Start: 2024-07-17 | End: 2024-07-17 | Stop reason: HOSPADM

## 2024-07-17 RX ORDER — PHENYLEPHRINE HYDROCHLORIDE 25 MG/ML
1 SOLUTION/ DROPS OPHTHALMIC
Status: DISCONTINUED | OUTPATIENT
Start: 2024-07-17 | End: 2024-07-17 | Stop reason: HOSPADM

## 2024-07-17 RX ORDER — ACETAMINOPHEN 325 MG/1
650 TABLET ORAL EVERY 4 HOURS PRN
Status: DISCONTINUED | OUTPATIENT
Start: 2024-07-17 | End: 2024-07-17 | Stop reason: HOSPADM

## 2024-07-17 RX ORDER — ONDANSETRON 4 MG/1
4 TABLET, FILM COATED ORAL EVERY 8 HOURS PRN
Qty: 12 TABLET | Refills: 0 | Status: SHIPPED | OUTPATIENT
Start: 2024-07-17

## 2024-07-17 RX ORDER — DEXAMETHASONE SODIUM PHOSPHATE 4 MG/ML
INJECTION, SOLUTION INTRA-ARTICULAR; INTRALESIONAL; INTRAMUSCULAR; INTRAVENOUS; SOFT TISSUE
Status: DISCONTINUED | OUTPATIENT
Start: 2024-07-17 | End: 2024-07-17 | Stop reason: HOSPADM

## 2024-07-17 RX ORDER — LIDOCAINE HYDROCHLORIDE 20 MG/ML
INJECTION, SOLUTION EPIDURAL; INFILTRATION; INTRACAUDAL; PERINEURAL
Status: DISCONTINUED | OUTPATIENT
Start: 2024-07-17 | End: 2024-07-17 | Stop reason: HOSPADM

## 2024-07-17 RX ORDER — VANCOMYCIN HYDROCHLORIDE 500 MG/10ML
INJECTION, POWDER, LYOPHILIZED, FOR SOLUTION INTRAVENOUS
Status: DISCONTINUED
Start: 2024-07-17 | End: 2024-07-17 | Stop reason: HOSPADM

## 2024-07-17 RX ORDER — PREDNISOLONE ACETATE 10 MG/ML
1 SUSPENSION/ DROPS OPHTHALMIC
Status: DISCONTINUED | OUTPATIENT
Start: 2024-07-17 | End: 2024-07-17 | Stop reason: HOSPADM

## 2024-07-17 RX ORDER — TETRACAINE HYDROCHLORIDE 5 MG/ML
1 SOLUTION OPHTHALMIC
Status: DISCONTINUED | OUTPATIENT
Start: 2024-07-17 | End: 2024-07-17 | Stop reason: HOSPADM

## 2024-07-17 RX ORDER — GLUCAGON 1 MG
1 KIT INJECTION
Status: DISCONTINUED | OUTPATIENT
Start: 2024-07-17 | End: 2024-07-17 | Stop reason: HOSPADM

## 2024-07-17 RX ORDER — SODIUM CHLORIDE 0.9 % (FLUSH) 0.9 %
10 SYRINGE (ML) INJECTION
Status: DISCONTINUED | OUTPATIENT
Start: 2024-07-17 | End: 2024-07-17 | Stop reason: HOSPADM

## 2024-07-17 RX ORDER — FENTANYL CITRATE 50 UG/ML
25 INJECTION, SOLUTION INTRAMUSCULAR; INTRAVENOUS EVERY 5 MIN PRN
Status: DISCONTINUED | OUTPATIENT
Start: 2024-07-17 | End: 2024-07-17 | Stop reason: HOSPADM

## 2024-07-17 RX ORDER — EPINEPHRINE 1 MG/ML
INJECTION, SOLUTION, CONCENTRATE INTRAVENOUS
Status: DISCONTINUED
Start: 2024-07-17 | End: 2024-07-17 | Stop reason: HOSPADM

## 2024-07-17 RX ORDER — DEXAMETHASONE SODIUM PHOSPHATE 4 MG/ML
INJECTION, SOLUTION INTRA-ARTICULAR; INTRALESIONAL; INTRAMUSCULAR; INTRAVENOUS; SOFT TISSUE
Status: DISCONTINUED
Start: 2024-07-17 | End: 2024-07-17 | Stop reason: HOSPADM

## 2024-07-17 RX ORDER — LIDOCAINE HYDROCHLORIDE 20 MG/ML
INJECTION INTRAVENOUS
Status: DISCONTINUED | OUTPATIENT
Start: 2024-07-17 | End: 2024-07-17

## 2024-07-17 RX ORDER — ATROPINE SULFATE 10 MG/ML
1 SOLUTION/ DROPS OPHTHALMIC
Status: DISCONTINUED | OUTPATIENT
Start: 2024-07-17 | End: 2024-07-17 | Stop reason: HOSPADM

## 2024-07-17 RX ORDER — OXYCODONE AND ACETAMINOPHEN 5; 325 MG/1; MG/1
1 TABLET ORAL EVERY 6 HOURS PRN
Qty: 12 TABLET | Refills: 0 | Status: SHIPPED | OUTPATIENT
Start: 2024-07-17

## 2024-07-17 RX ORDER — MOXIFLOXACIN 5 MG/ML
1 SOLUTION/ DROPS OPHTHALMIC
Status: DISCONTINUED | OUTPATIENT
Start: 2024-07-17 | End: 2024-07-17 | Stop reason: HOSPADM

## 2024-07-17 RX ORDER — NEOMYCIN SULFATE, POLYMYXIN B SULFATE, AND DEXAMETHASONE 3.5; 10000; 1 MG/G; [USP'U]/G; MG/G
OINTMENT OPHTHALMIC
Status: DISCONTINUED | OUTPATIENT
Start: 2024-07-17 | End: 2024-07-17 | Stop reason: HOSPADM

## 2024-07-17 RX ORDER — HYDROCODONE BITARTRATE AND ACETAMINOPHEN 5; 325 MG/1; MG/1
1 TABLET ORAL EVERY 4 HOURS PRN
Status: DISCONTINUED | OUTPATIENT
Start: 2024-07-17 | End: 2024-07-17 | Stop reason: HOSPADM

## 2024-07-17 RX ORDER — NEOMYCIN SULFATE, POLYMYXIN B SULFATE, AND DEXAMETHASONE 3.5; 10000; 1 MG/G; [USP'U]/G; MG/G
OINTMENT OPHTHALMIC
Status: DISCONTINUED
Start: 2024-07-17 | End: 2024-07-17 | Stop reason: HOSPADM

## 2024-07-17 RX ORDER — PROPOFOL 10 MG/ML
VIAL (ML) INTRAVENOUS
Status: DISCONTINUED | OUTPATIENT
Start: 2024-07-17 | End: 2024-07-17

## 2024-07-17 RX ADMIN — ATROPINE SULFATE 1 DROP: 10 SOLUTION/ DROPS OPHTHALMIC at 08:07

## 2024-07-17 RX ADMIN — PHENYLEPHRINE HYDROCHLORIDE 1 DROP: 25 SOLUTION/ DROPS OPHTHALMIC at 08:07

## 2024-07-17 RX ADMIN — MOXIFLOXACIN OPHTHALMIC 1 DROP: 5 SOLUTION/ DROPS OPHTHALMIC at 08:07

## 2024-07-17 RX ADMIN — PREDNISOLONE ACETATE 1 DROP: 10 SUSPENSION/ DROPS OPHTHALMIC at 08:07

## 2024-07-17 RX ADMIN — SODIUM CHLORIDE: 9 INJECTION, SOLUTION INTRAVENOUS at 09:07

## 2024-07-17 RX ADMIN — LIDOCAINE HYDROCHLORIDE 40 MG: 20 INJECTION INTRAVENOUS at 09:07

## 2024-07-17 RX ADMIN — PROPOFOL 70 MG: 10 INJECTION, EMULSION INTRAVENOUS at 09:07

## 2024-07-17 RX ADMIN — MIDAZOLAM HYDROCHLORIDE 2 MG: 2 INJECTION, SOLUTION INTRAMUSCULAR; INTRAVENOUS at 09:07

## 2024-07-17 RX ADMIN — TETRACAINE HYDROCHLORIDE 1 DROP: 5 SOLUTION OPHTHALMIC at 08:07

## 2024-07-17 NOTE — TRANSFER OF CARE
"Anesthesia Transfer of Care Note    Patient: Megan Mccall    Procedure(s) Performed: Procedure(s) (LRB):  REMOVAL, EPIRETINAL MEMBRANE (Right)    Patient location: PACU    Anesthesia Type: general    Transport from OR: Transported from OR on room air with adequate spontaneous ventilation    Post pain: adequate analgesia    Post assessment: no apparent anesthetic complications and tolerated procedure well    Post vital signs: stable    Level of consciousness: awake, alert and oriented    Nausea/Vomiting: no nausea/vomiting    Complications: none    Transfer of care protocol was followedComments: Nurse at bedside, VSS, spont reg resp noted      Last vitals: Visit Vitals  BP (!) 142/67 (BP Location: Right arm, Patient Position: Lying)   Pulse 65   Temp 36.4 °C (97.5 °F) (Temporal)   Resp 18   Ht 5' 4" (1.626 m)   Wt 53.1 kg (117 lb)   SpO2 100%   Breastfeeding No   BMI 20.08 kg/m²     "

## 2024-07-17 NOTE — DISCHARGE SUMMARY
[facility] - [facility] (1st Fl)  Discharge Note  Short Stay    Procedure(s) (LRB):  REMOVAL, EPIRETINAL MEMBRANE (Right)      OUTCOME: Patient tolerated treatment/procedure well without complication and is now ready for discharge.    DISPOSITION: Home or Self Care    FINAL DIAGNOSIS:  [facility] OD    FOLLOWUP: In clinic    DISCHARGE INSTRUCTIONS:  No discharge procedures on file.     TIME SPENT ON DISCHARGE:    minutes

## 2024-07-17 NOTE — OP NOTE
Preoperative diagnosis: Full thickness macular hole OD    Post op Dx: same    Procedure performed:  25g PPV/ILM peel with flap/AFx/C3F8 OD     Attending surgeons:  ADALI King    Anesthesia:  Local/MAC, with retrobulbar injection 4.0cc mixture of 2% lidocaine, 0.75% marcaine    Estimated blood loss: minimal    Complications:  None    DOS: 7/17/24    Disposition: stable to recovery then home    Indications for surgery:   This is a 64 yo patient who presented with  distortion and blurred central vision the right eye.   OCT examination patient revealed a macular hole.  Decision was made to repair the macular hole to improved vision and quality of life. Risks, benefits, and alternatives to surgery were discussed in detail. Risks including loss of vision, loss of eye, retina detachment, hemorrhage, infection, lens dislocation, glaucoma, hypotony, ptosis, diplopia    Description of procedure:  After proper informed consent was obtained, the patient was brought back to the operating room at [facility] where monitored anesthesia care was induced.  A retrobulbar injection was provided above without complication.  The patient is prepped draped in normal sterile fashion for ophthalmic surgery and a lid speculum was placed in the right eye.  A standard 3 port 25 gauge pars plana vitrectomy setup with the infusion cannula inserted 3.5 mm posterior to the limbus.  The infusion cannula was turned after it was observed free and clear of all underlying tissue.  Super nasal and superotemporal trocars were also placed 3.5 mm posterior to the limbus.  The vitrector and light pipe were introduced in the vitreous cavity and a core vitrectomy was performed.  Posterior hyaloid  was elevated and propagated out the level of the equator.  A 360 degree cortical vitrectomy was performed, no retinal breaks were found.   ICG was used to stain the epiretinal membrane and internal limiting membrane complex.  Both were peeled off  under direct contact lens visualization with the ILM forceps.  And temporal based ILM flap was created and placed over the hole. and air-fluid exchanges performed - centered the flap. the SN trocar was removed and not leaking after gentle massage. a 14% C3F8 gas mixture was created.  Approximately 40 cc were cycled through the eye.  The superotemporal and infusion trocars removed and not leaking after gentle massage.  The eye was normal pressure via palpation.  Subconjunctival injections of vancomycin and Decadron were given and  the patient's the drapes removed. the patient she was washed free of Betadine prep solution,  Maxitrol ointment was placed in the left eye then patched shielded, mac anesthesia was reversed and she was brought to recovery in stable condition tolerating the procedure well.  Dr. King was present for in entire case.

## 2024-07-17 NOTE — ANESTHESIA POSTPROCEDURE EVALUATION
Anesthesia Post Evaluation    Patient: Megan Mccall    Procedure(s) Performed: Procedure(s) (LRB):  REMOVAL, EPIRETINAL MEMBRANE (Right)    Final Anesthesia Type: MAC      Patient location during evaluation: OPS  Patient participation: Yes- Able to Participate  Level of consciousness: awake  Post-procedure vital signs: reviewed and stable  Pain management: adequate  Airway patency: patent    PONV status at discharge: No PONV  Anesthetic complications: no      Cardiovascular status: blood pressure returned to baseline and stable  Respiratory status: room air, spontaneous ventilation and unassisted  Hydration status: euvolemic  Follow-up not needed.              Vitals Value Taken Time   /55 07/17/24 1146   Temp 36.5 °C (97.7 °F) 07/17/24 1145   Pulse 67 07/17/24 1154   Resp 25 07/17/24 1154   SpO2 96 % 07/17/24 1154   Vitals shown include unfiled device data.      No case tracking events are documented in the log.      Pain/Prisca Score: Prisca Score: 10 (7/17/2024 10:45 AM)

## 2024-07-17 NOTE — ANESTHESIA PREPROCEDURE EVALUATION
07/17/2024  Megan Mccall is a 63 y.o., female.    TTE 2022:  The left ventricle is normal in size with concentric remodeling and normal systolic function.  The estimated ejection fraction is 55%.  Normal left ventricular diastolic function.  Normal right ventricular size with normal right ventricular systolic function.  The estimated PA systolic pressure is 23 mmHg.  Normal central venous pressure (3 mmHg).  Trivial circumferential pericardial effusion.      LHC 2022:    The ejection fraction was greater than 55% by visual estimate.    The left ventricular systolic function was normal.    A small area of apical akinesis is noted    The left ventricular end diastolic pressure was normal.    The estimated blood loss was <50 mL.    There was non-obstructive coronary artery disease..    Anesthesia Evaluation    I have reviewed the Patient Summary Reports.     I have reviewed the Nursing Notes.    I have reviewed the Medications.     Review of Systems  Anesthesia Hx:  No previous Anesthesia                Social:  Smoker, Alcohol Use 3-4 drinks every other day    Smokes 1/2 pack per day since she was 28 years old      Cardiovascular:     Denies Pacemaker.  Denies Hypertension.   Denies MI. CAD       Denies Angina.                                  Pulmonary:  Pulmonary Normal                       Renal/:  Renal/ Normal                 Hepatic/GI:      Denies GERD. Denies Liver Disease.  Denies Hepatitis.           Neurological:    Denies CVA.    Denies Seizures.                                Endocrine:  Denies Diabetes.               Physical Exam  General:  Well nourished       Airway/Jaw/Neck:  Airway Findings: Mouth Opening: Normal     Tongue: Normal                          Dental:  Dental Findings: Periodontal disease, Mild      Chest/Lungs:  Chest/Lungs Findings:  Normal Respiratory Rate        Heart/Vascular:  Heart Findings: Rate: Normal                              Anesthesia Plan  Type of Anesthesia, risks & benefits discussed:  Anesthesia Type:  general, MAC    Patient's Preference:   Plan Factors:          Intra-op Monitoring Plan: standard ASA monitors  Intra-op Monitoring Plan Comments:   Post Op Pain Control Plan: per primary service following discharge from PACU  Post Op Pain Control Plan Comments:     Induction:   IV  Beta Blocker:         Informed Consent: Informed consent signed with the Patient and all parties understand the risks and agree with anesthesia plan.  All questions answered.    ASA Score: 3     Day of Surgery Review of History & Physical:              Ready For Surgery From Anesthesia Perspective.             Physical Exam  General: Well nourished    Airway:  Mouth Opening: Normal  Tongue: Normal    Dental:  Periodontal disease, Mild    Chest/Lungs:  Normal Respiratory Rate    Heart:  Rate: Normal        Anesthesia Plan  Type of Anesthesia, risks & benefits discussed:    Anesthesia Type: general, MAC  Intra-op Monitoring Plan: standard ASA monitors  Post Op Pain Control Plan: per primary service following discharge from PACU  Induction:  IV  Informed Consent: Informed consent signed with the Patient and all parties understand the risks and agree with anesthesia plan.  All questions answered.   ASA Score: 3    Ready For Surgery From Anesthesia Perspective.     .

## 2024-07-17 NOTE — BRIEF OP NOTE
A 360 degree cortical vitrectomy was performed, no retinal breaks were found    Attending Surgeon: Fernando    Anesthesia: Local/Mac, retrobulbar injection of 4.0cc mixture 0.75%Marcaine, 2% Xylocaine    Estimated blood loss: Minimal    Complication: None    Specimen: None    Disposition: Stable to recovery    Findings/Outcome: Well centered ILM flap under air    Date of Discharge: 7/17/24    Discharge Disposition: stable to recovery then home    F/U: tomorrow

## 2024-07-18 ENCOUNTER — OFFICE VISIT (OUTPATIENT)
Dept: OPHTHALMOLOGY | Facility: CLINIC | Age: 64
End: 2024-07-18
Payer: COMMERCIAL

## 2024-07-18 DIAGNOSIS — H35.341 LAMELLAR MACULAR HOLE OF RIGHT EYE: Primary | ICD-10-CM

## 2024-07-18 PROCEDURE — 1160F RVW MEDS BY RX/DR IN RCRD: CPT | Mod: CPTII,S$GLB,, | Performed by: OPHTHALMOLOGY

## 2024-07-18 PROCEDURE — 1159F MED LIST DOCD IN RCRD: CPT | Mod: CPTII,S$GLB,, | Performed by: OPHTHALMOLOGY

## 2024-07-18 PROCEDURE — 99024 POSTOP FOLLOW-UP VISIT: CPT | Mod: S$GLB,,, | Performed by: OPHTHALMOLOGY

## 2024-07-18 PROCEDURE — 4010F ACE/ARB THERAPY RXD/TAKEN: CPT | Mod: CPTII,S$GLB,, | Performed by: OPHTHALMOLOGY

## 2024-07-18 PROCEDURE — 99999 PR PBB SHADOW E&M-EST. PATIENT-LVL IV: CPT | Mod: PBBFAC,,, | Performed by: OPHTHALMOLOGY

## 2024-07-18 NOTE — PROGRESS NOTES
Subjective:       Patient ID: Megan Mccall is a 63 y.o. female      Chief Complaint   Patient presents with    Post-op Evaluation     History of Present Illness  HPI    1 day PO OD    Pt states she is feeling well today.   Pt states she did not take any pain meds since her surgery.     Pt states she would like in detail instructions on care at home.     Eye meds:   Predforte: Qid OD   Moxi: Qid OD   Atropine : Qid OD     Floaters in os started yesterday after procedure:     Last edited by Marcus Johnson MD on 7/18/2024 11:20 AM.        Imaging:    See report    Assessment/Plan:     1. Lamellar macular hole of right eye  Doing well POD#1  PF 4/0  Vig 4/0  Atropine 4/0  Oint QHS  No vigorous activity, no lifting, bending, etc.  Le shield sleeping  Le shield, glasses, or sunglasses all times for protection   No shower   Face down, side to side down positioning as instructed by Dr EMY DANG/Endophthalmitis precautions   RTC 1 wk sooner PRN if any problems (jhon pain, redness, dimming or loss of vision)     Follow up in about 1 week (around 7/25/2024), or if symptoms worsen or fail to improve, for Post-op.

## 2024-07-25 ENCOUNTER — OFFICE VISIT (OUTPATIENT)
Dept: OPHTHALMOLOGY | Facility: CLINIC | Age: 64
End: 2024-07-25
Payer: COMMERCIAL

## 2024-07-25 DIAGNOSIS — H35.341 FULL THICKNESS MACULAR HOLE, RIGHT: Primary | ICD-10-CM

## 2024-07-25 DIAGNOSIS — H35.371 EPIRETINAL MEMBRANE, RIGHT: ICD-10-CM

## 2024-07-25 PROCEDURE — 4010F ACE/ARB THERAPY RXD/TAKEN: CPT | Mod: CPTII,S$GLB,, | Performed by: OPHTHALMOLOGY

## 2024-07-25 PROCEDURE — 99024 POSTOP FOLLOW-UP VISIT: CPT | Mod: S$GLB,,, | Performed by: OPHTHALMOLOGY

## 2024-07-25 PROCEDURE — 1159F MED LIST DOCD IN RCRD: CPT | Mod: CPTII,S$GLB,, | Performed by: OPHTHALMOLOGY

## 2024-07-25 PROCEDURE — 1160F RVW MEDS BY RX/DR IN RCRD: CPT | Mod: CPTII,S$GLB,, | Performed by: OPHTHALMOLOGY

## 2024-07-25 PROCEDURE — 99999 PR PBB SHADOW E&M-EST. PATIENT-LVL III: CPT | Mod: PBBFAC,,, | Performed by: OPHTHALMOLOGY
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
## 2024-08-29 ENCOUNTER — TELEPHONE (OUTPATIENT)
Dept: OPHTHALMOLOGY | Facility: CLINIC | Age: 64
End: 2024-08-29

## 2024-08-29 ENCOUNTER — OFFICE VISIT (OUTPATIENT)
Dept: OPHTHALMOLOGY | Facility: CLINIC | Age: 64
End: 2024-08-29
Payer: COMMERCIAL

## 2024-08-29 DIAGNOSIS — H35.341 FULL THICKNESS MACULAR HOLE, RIGHT: Primary | ICD-10-CM

## 2024-08-29 DIAGNOSIS — H35.371 EPIRETINAL MEMBRANE, RIGHT: ICD-10-CM

## 2024-08-29 PROCEDURE — 4010F ACE/ARB THERAPY RXD/TAKEN: CPT | Mod: CPTII,S$GLB,, | Performed by: OPHTHALMOLOGY

## 2024-08-29 PROCEDURE — 99024 POSTOP FOLLOW-UP VISIT: CPT | Mod: S$GLB,,, | Performed by: OPHTHALMOLOGY

## 2024-08-29 PROCEDURE — 1160F RVW MEDS BY RX/DR IN RCRD: CPT | Mod: CPTII,S$GLB,, | Performed by: OPHTHALMOLOGY

## 2024-08-29 PROCEDURE — 1159F MED LIST DOCD IN RCRD: CPT | Mod: CPTII,S$GLB,, | Performed by: OPHTHALMOLOGY

## 2024-08-29 PROCEDURE — 99999 PR PBB SHADOW E&M-EST. PATIENT-LVL III: CPT | Mod: PBBFAC,,, | Performed by: OPHTHALMOLOGY
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
## 2024-08-29 NOTE — TELEPHONE ENCOUNTER
----- Message from Dayana Piper sent at 8/29/2024 11:58 AM CDT -----  Call pt for CE would like to try to get sx done by end of year  thanks

## 2024-09-06 ENCOUNTER — TELEPHONE (OUTPATIENT)
Dept: PHARMACY | Facility: CLINIC | Age: 64
End: 2024-09-06
Payer: COMMERCIAL

## 2024-09-06 NOTE — TELEPHONE ENCOUNTER
[facility] Chart Review & Patient Outreach Details For Medication Adherence Project    Reason for Outreach Encounter: 3rd Party payor non-compliance report ([employer], [employer], [employer], etc)    2.  Patient Outreach Method: Reviewed patient chart     3.   Medication in question:   Hypertension Medications               losartan (COZAAR) 25 MG tablet Take 1 tablet (25 mg total) by mouth once daily.    metoprolol succinate (TOPROL-XL) 25 MG 24 hr tablet Take 1 tablet (25 mg total) by mouth once daily.              Hyperlipidemia Medications               atorvastatin (LIPITOR) 80 MG tablet Take 1 tablet (80 mg total) by mouth once daily.     LF 90 ds 8/22/24          LAST FILLED: 8/19/24 for 90 day supply    4.  Reviewed and or Updates Made To: Patient Chart    5. Outreach Outcomes and/or actions taken: Patient filled medication and is on track to be adherent    Additional Notes:

## 2024-10-02 ENCOUNTER — PATIENT MESSAGE (OUTPATIENT)
Dept: PSYCHIATRY | Facility: CLINIC | Age: 64
End: 2024-10-02
Payer: COMMERCIAL

## 2024-10-03 ENCOUNTER — PATIENT MESSAGE (OUTPATIENT)
Dept: PSYCHIATRY | Facility: CLINIC | Age: 64
End: 2024-10-03
Payer: COMMERCIAL

## 2024-10-07 ENCOUNTER — PATIENT MESSAGE (OUTPATIENT)
Dept: PSYCHIATRY | Facility: CLINIC | Age: 64
End: 2024-10-07
Payer: COMMERCIAL

## 2024-10-23 ENCOUNTER — TELEPHONE (OUTPATIENT)
Dept: PHARMACY | Facility: CLINIC | Age: 64
End: 2024-10-23
Payer: COMMERCIAL

## 2024-10-23 NOTE — TELEPHONE ENCOUNTER
[facility] Chart Review & Patient Outreach Details For Medication Adherence Project    Reason for Outreach Encounter: 3rd Party payor non-compliance report ([employer], BCBS, C, etc)  2.  Patient Outreach Method: Reviewed patient chart   3.   Medication in question:   Hyperlipidemia Medications               atorvastatin (LIPITOR) 80 MG tablet Take 1 tablet (80 mg total) by mouth once daily.                  Lipitor  last filled  8/22/24 for 90 day supply      4.  Reviewed and or Updates Made To: Patient Chart  5. Outreach Outcomes and/or actions taken: Patient filled medication and is on track to be adherent  Additional Notes:

## 2024-11-05 ENCOUNTER — OFFICE VISIT (OUTPATIENT)
Dept: OPHTHALMOLOGY | Facility: CLINIC | Age: 64
End: 2024-11-05
Payer: COMMERCIAL

## 2024-11-05 ENCOUNTER — HOSPITAL ENCOUNTER (OUTPATIENT)
Dept: RADIOLOGY | Facility: HOSPITAL | Age: 64
Discharge: HOME | End: 2024-11-05
Payer: COMMERCIAL

## 2024-11-05 DIAGNOSIS — Z12.31 ENCOUNTER FOR SCREENING MAMMOGRAM FOR BREAST CANCER: ICD-10-CM

## 2024-11-05 DIAGNOSIS — H25.13 NUCLEAR SCLEROSIS, BILATERAL: ICD-10-CM

## 2024-11-05 DIAGNOSIS — H35.341 FULL THICKNESS MACULAR HOLE, RIGHT: Primary | ICD-10-CM

## 2024-11-05 PROCEDURE — 77067 SCR MAMMO BI INCL CAD: CPT | Mod: TC

## 2024-11-05 PROCEDURE — 99999 PR PBB SHADOW E&M-EST. PATIENT-LVL III: CPT | Mod: PBBFAC,,, | Performed by: OPHTHALMOLOGY

## 2024-11-05 RX ORDER — PREDNISOLONE ACETATE-GATIFLOXACIN-BROMFENAC .75; 5; 1 MG/ML; MG/ML; MG/ML
1 SUSPENSION/ DROPS OPHTHALMIC 3 TIMES DAILY
Qty: 5 ML | Refills: 3 | Status: SHIPPED | OUTPATIENT
Start: 2024-11-05

## 2024-11-05 RX ORDER — ATROPINE SULFATE 10 MG/ML
SOLUTION/ DROPS OPHTHALMIC
COMMUNITY
Start: 2024-07-29

## 2024-11-05 RX ORDER — PREDNISOLONE ACETATE 10 MG/ML
SUSPENSION/ DROPS OPHTHALMIC
COMMUNITY
Start: 2024-07-29

## 2024-11-05 RX ORDER — FENTANYL CITRATE 50 UG/ML
25 INJECTION, SOLUTION INTRAMUSCULAR; INTRAVENOUS
Status: SHIPPED | OUTPATIENT
Start: 2024-11-05

## 2024-11-05 RX ORDER — MIDAZOLAM HYDROCHLORIDE 1 MG/ML
1 INJECTION, SOLUTION INTRAMUSCULAR; INTRAVENOUS
Status: SHIPPED | OUTPATIENT
Start: 2024-11-05

## 2024-11-05 RX ORDER — CYCLOP/TROP/PROPA/PHEN/KET/WAT 1-1-0.1%
1 DROPS (EA) OPHTHALMIC (EYE)
OUTPATIENT
Start: 2024-11-05 | End: 2024-11-05

## 2024-11-05 NOTE — PROGRESS NOTES
HPI    Patient is here today for a cataract evaluation. Last seen on 08/29/2024   with Dr. King. Pt denies difficulty with glare or driving at night OU.       Eye Meds: none    Past Ocular Sx:  25g PPV/ILM peel with flap/AFx/C3F8 OD 7/17/24  Last edited by Doris Rivera MA on 11/5/2024  2:23 PM.            Assessment /Plan     For exam results, see Encounter Report.    Full thickness macular hole, right    Nuclear sclerosis, bilateral      Visually Significant Cataract: Patient reports decreased vision consistent with the clinical amount of lenticular opacity, which reaches the level of visual significance and affects activities of daily living.     Specifically, this patient describes difficulty with:  - driving safely at night  - reading road signs  - reading small print  - deciphering medicine bottles  - reading the newspaper  - using the phone  - reading texts     Risks, benefits, and alternatives to cataract surgery were discussed and the consent reviewed. IOL options were discussed, including ATIOLs and the associated side effects and additional patient cost associated with them.   IOL Selections:   Right eye  IOL: CNAOTO 20.0     Left eye  IOL: CNAOTO 20.0 (HOLD)    Pt wishes to have RIGHT eye done first.  S/P ppv od

## 2024-11-06 ENCOUNTER — PATIENT MESSAGE (OUTPATIENT)
Dept: OPHTHALMOLOGY | Facility: CLINIC | Age: 64
End: 2024-11-06
Payer: COMMERCIAL

## 2024-11-07 ENCOUNTER — TELEPHONE (OUTPATIENT)
Dept: OPHTHALMOLOGY | Facility: CLINIC | Age: 64
End: 2024-11-07
Payer: COMMERCIAL

## 2024-11-07 DIAGNOSIS — H25.11 NUCLEAR SCLEROSIS OF RIGHT EYE: Primary | ICD-10-CM

## 2024-12-16 ENCOUNTER — TELEPHONE (OUTPATIENT)
Dept: OPHTHALMOLOGY | Facility: CLINIC | Age: 64
End: 2024-12-16
Payer: COMMERCIAL

## 2024-12-16 ENCOUNTER — PATIENT MESSAGE (OUTPATIENT)
Dept: OPHTHALMOLOGY | Facility: CLINIC | Age: 64
End: 2024-12-16
Payer: COMMERCIAL

## 2024-12-16 NOTE — TELEPHONE ENCOUNTER
Patient given arrival time of 8:00 am on Thursday December 19. Nothing to eat or drink after 11:59 pm. Start drops into the operative eye Tuesday . [address] [facility]

## 2024-12-18 NOTE — PRE-PROCEDURE INSTRUCTIONS
Patient reviewed on 12/18/2024.  Okay to proceed at [place]. The following pre-procedure instructions and arrival time have been reviewed with patient via phone and sent to patient portal for review.  Patient verbalized an understanding.  Pt to be accompanied by Golden day of procedure as responsible .      Dear Megan     Below you will find basic pre-procedure instructions in preparation for your procedure on 12/19/24 with Dr. Rodriguez                 You should receive your arrival time within 24-48 hours of your scheduled procedure date from the  at your surgeon's office.     -Nothing to eat or drink after midnight the night before your procedure until after your procedure, except AM meds with small sips of water.     - HOLD all oral Diabetic medications night before and morning of procedure  - HOLD all Insulin morning of procedure  - HOLD all Fluid pills morning of procedure  - HOLD all non-insulin shots until after surgery (Ozempic, Mounjaro, Trulicity, Victoza, Byetta, Wegovy and Adlyxin) (7 days prior)  - HOLD all vitamins, minerals and herbal supplements morning of procedure   - TAKE all B/P meds, EXCEPT those that contain a fluid pill (ex. Lasix, Hydroclorothiazide/HCTZ, Spirnolactone)  - USE inhalers as needed and bring AM of surgery  - USE EYE DROPS as directed  -TAKE blood thinner meds AM of surgery unless otherwise instructed by your provider to not take     - Shower and wash face with antibacterial soap (ex. Dial) for 3 mins PM prior and AM of surgery  - No powder, lotions, creams, oils, gels, ointments, makeup,  or jewelry    - Wear comfortable clothing (button up shirt)     (Patient is required to have a responsible ride to transport home, ride may not leave while patient is in surgery)     -- [facility], 2nd floor Surgery Center, located   @ [address] [place] [address], [place] [address]  2nd Floor Registration        If you have any questions or concerns please feel free  to contact your surgeon's office.           Please reply to this message as receipt of delivery.     [place], LPN [facility]  Pre-Admit - Anesthesia Dept

## 2024-12-19 ENCOUNTER — HOSPITAL ENCOUNTER (OUTPATIENT)
Facility: HOSPITAL | Age: 64
Discharge: HOME | End: 2024-12-19
Attending: OPHTHALMOLOGY | Admitting: OPHTHALMOLOGY
Payer: COMMERCIAL

## 2024-12-19 VITALS
BODY MASS INDEX: 19.63 KG/M2 | DIASTOLIC BLOOD PRESSURE: 67 MMHG | SYSTOLIC BLOOD PRESSURE: 145 MMHG | WEIGHT: 115 LBS | TEMPERATURE: 98 F | HEIGHT: 64 IN | RESPIRATION RATE: 17 BRPM | OXYGEN SATURATION: 98 % | HEART RATE: 78 BPM

## 2024-12-19 DIAGNOSIS — H25.11 NUCLEAR SCLEROSIS OF RIGHT EYE: ICD-10-CM

## 2024-12-19 DIAGNOSIS — H35.371 EPIRETINAL MEMBRANE, RIGHT: Primary | ICD-10-CM

## 2024-12-19 PROCEDURE — 99900035 HC TECH TIME PER 15 MIN (STAT)

## 2024-12-19 PROCEDURE — 71000015 HC POSTOP RECOV 1ST HR: Performed by: OPHTHALMOLOGY

## 2024-12-19 PROCEDURE — 36000707: Performed by: OPHTHALMOLOGY

## 2024-12-19 PROCEDURE — 94761 N-INVAS EAR/PLS OXIMETRY MLT: CPT | Mod: 59

## 2024-12-19 PROCEDURE — 25000003 PHARM REV CODE 250: Performed by: OPHTHALMOLOGY

## 2024-12-19 PROCEDURE — 99152 MOD SED SAME PHYS/QHP 5/>YRS: CPT | Mod: ,,, | Performed by: OPHTHALMOLOGY

## 2024-12-19 PROCEDURE — 63600175 PHARM REV CODE 636 W HCPCS: Performed by: OPHTHALMOLOGY

## 2024-12-19 PROCEDURE — 99152 MOD SED SAME PHYS/QHP 5/>YRS: CPT | Performed by: OPHTHALMOLOGY

## 2024-12-19 PROCEDURE — 36000706: Performed by: OPHTHALMOLOGY

## 2024-12-19 PROCEDURE — V2632 POST CHMBR INTRAOCULAR LENS: HCPCS | Performed by: OPHTHALMOLOGY

## 2024-12-19 PROCEDURE — 66984 XCAPSL CTRC RMVL W/O ECP: CPT | Mod: RT,,, | Performed by: OPHTHALMOLOGY

## 2024-12-19 DEVICE — LENS CLAREON AUTONOME 20.0D: Type: IMPLANTABLE DEVICE | Site: EYE | Status: FUNCTIONAL

## 2024-12-19 RX ORDER — MIDAZOLAM HYDROCHLORIDE 1 MG/ML
1 INJECTION, SOLUTION INTRAMUSCULAR; INTRAVENOUS
Status: DISCONTINUED | OUTPATIENT
Start: 2024-12-19 | End: 2024-12-19 | Stop reason: HOSPADM

## 2024-12-19 RX ORDER — MOXIFLOXACIN 5 MG/ML
1 SOLUTION/ DROPS OPHTHALMIC
Status: COMPLETED | OUTPATIENT
Start: 2024-12-19 | End: 2024-12-19

## 2024-12-19 RX ORDER — SODIUM CHLORIDE 0.9 % (FLUSH) 0.9 %
10 SYRINGE (ML) INJECTION
Status: DISCONTINUED | OUTPATIENT
Start: 2024-12-19 | End: 2024-12-19 | Stop reason: HOSPADM

## 2024-12-19 RX ORDER — ACETAMINOPHEN 325 MG/1
650 TABLET ORAL EVERY 4 HOURS PRN
Status: DISCONTINUED | OUTPATIENT
Start: 2024-12-19 | End: 2024-12-19 | Stop reason: HOSPADM

## 2024-12-19 RX ORDER — MOXIFLOXACIN 5 MG/ML
SOLUTION/ DROPS OPHTHALMIC
Status: DISCONTINUED | OUTPATIENT
Start: 2024-12-19 | End: 2024-12-19 | Stop reason: HOSPADM

## 2024-12-19 RX ORDER — LIDOCAINE HYDROCHLORIDE 40 MG/ML
INJECTION, SOLUTION RETROBULBAR
Status: DISCONTINUED | OUTPATIENT
Start: 2024-12-19 | End: 2024-12-19 | Stop reason: HOSPADM

## 2024-12-19 RX ORDER — PROPARACAINE HYDROCHLORIDE 5 MG/ML
SOLUTION/ DROPS OPHTHALMIC
Status: DISCONTINUED | OUTPATIENT
Start: 2024-12-19 | End: 2024-12-19 | Stop reason: HOSPADM

## 2024-12-19 RX ORDER — TROPICAMIDE 10 MG/ML
1 SOLUTION/ DROPS OPHTHALMIC
Status: COMPLETED | OUTPATIENT
Start: 2024-12-19 | End: 2024-12-19

## 2024-12-19 RX ORDER — PHENYLEPHRINE HYDROCHLORIDE 100 MG/ML
1 SOLUTION/ DROPS OPHTHALMIC
Status: DISCONTINUED | OUTPATIENT
Start: 2024-12-19 | End: 2024-12-19 | Stop reason: HOSPADM

## 2024-12-19 RX ORDER — PHENYLEPHRINE HYDROCHLORIDE 25 MG/ML
1 SOLUTION/ DROPS OPHTHALMIC
Status: COMPLETED | OUTPATIENT
Start: 2024-12-19 | End: 2024-12-19

## 2024-12-19 RX ORDER — TETRACAINE HYDROCHLORIDE 5 MG/ML
1 SOLUTION OPHTHALMIC
Status: COMPLETED | OUTPATIENT
Start: 2024-12-19 | End: 2024-12-19

## 2024-12-19 RX ORDER — PROPARACAINE HYDROCHLORIDE 5 MG/ML
1 SOLUTION/ DROPS OPHTHALMIC
Status: DISCONTINUED | OUTPATIENT
Start: 2024-12-19 | End: 2024-12-19 | Stop reason: HOSPADM

## 2024-12-19 RX ADMIN — MIDAZOLAM HYDROCHLORIDE 2 MG: 1 INJECTION, SOLUTION INTRAMUSCULAR; INTRAVENOUS at 09:12

## 2024-12-19 RX ADMIN — TETRACAINE HYDROCHLORIDE 1 DROP: 5 SOLUTION OPHTHALMIC at 08:12

## 2024-12-19 RX ADMIN — MOXIFLOXACIN 1 DROP: 5 SOLUTION/ DROPS OPHTHALMIC at 10:12

## 2024-12-19 RX ADMIN — PHENYLEPHRINE HYDROCHLORIDE 1 DROP: 25 SOLUTION/ DROPS OPHTHALMIC at 08:12

## 2024-12-19 RX ADMIN — TROPICAMIDE 1 DROP: 10 SOLUTION/ DROPS OPHTHALMIC at 08:12

## 2024-12-19 RX ADMIN — MOXIFLOXACIN 1 DROP: 5 SOLUTION/ DROPS OPHTHALMIC at 09:12

## 2024-12-19 RX ADMIN — MOXIFLOXACIN OPHTHALMIC 1 DROP: 5 SOLUTION/ DROPS OPHTHALMIC at 08:12

## 2024-12-19 NOTE — DISCHARGE SUMMARY
Outcome: Successful outpatient ophthalmic surgical procedure  Preprinted Instructions given to patient.  Regular diet.  Activity: No restrictions  Meds: see Med Rec  Condition: stable  Follow up: 1 day with Dr Rodriguez  Disposition: Home  Diagnosis: s/p eye surgery  Date of discharge: 12/19/2024

## 2024-12-19 NOTE — DISCHARGE INSTRUCTIONS
CATARACT SURGERY    POST-OPERATIVE INSTRUCTIONS    · Apply drops THREE times a day into operative eye for 30 days.    · DO NOT rub your eye    · Wear protective sunglasses during the day    · Resume moderate activity    · Bathe/shower/wash face normally    · DO NOT apply makeup around the operative eye for 1 week.         You should expect    - Blurry vision and halos for 24-48 hours    - Dilated pupil for 24-48 hours    - Scratchy feeling in the eye for 1-2 days    - Curved shadow in your peripheral vision for 2-3 weeks    - Occasional flickering of lights for up to 1 week    -If you experience severe pain or nausea, please call Dr Rodriguez or the on-call doctor at [phone]    - Plan to see Dr Rodriguez tomorrow .      [facility]    [address] [facility] [address]    ** Most patients can drive the next day, but if you do not feel comfortable driving, please arrange for transportation.

## 2024-12-19 NOTE — OP NOTE
SURGEON:  Belen Rodriguez M.D.    PREOPERATIVE DIAGNOSIS:    Nuclear Sclerotic Cataract Right Eye    POSTOPERATIVE DIAGNOSIS:    Nuclear Sclerotic Cataract Right Eye    PROCEDURES:    Phacoemulsification with  intraocular lens, Right eye (04623)  With moderate sedation >10min (26269)    DATE OF SURGERY: 12/19/2024    IMPLANT: [email or web address] 20.0    ANESTHESIA:  Under my direct supervision, intravenous moderate sedation was administered during the course of this procedure, with continuous monitoring of hemodynamic parameters. Total time of sedation and amount of sedatives are documented in the nursing logs.    COMPLICATIONS:  None    ESTIMATED BLOOD LOSS: None    SPECIMENS: None    INDICATIONS:    The patient has a history of painless progressive visual loss and difficulty with activities of daily living, which specifically include difficult driving at night due to glare and difficulty reading small print, secondary to cataract formation.  After a thorough discussion of the risks, benefits, and alternatives to cataract surgery, including, but not limited to, the rare risks of infection, retinal detachment, hemorrhage, need for additional surgery, loss of vision, and even loss of the eye, the patient voices understanding and desires to proceed.    DESCRIPTION OF PROCEDURE:    The patients IOL calculations were reviewed, and the lens selection confirmed.   After verification and marking of the proper eye in the preop holding area, the patient was brought to the operating room in supine position where the eye was prepped and draped in standard sterile fashion with 5% Betadine and a lid speculum placed in the eye.   Topical 4% Lidocaine was used in addition to the preoperative anesthesia and the procedure was begun by the creation of a paracentesis incision through which viscoelastic was used to fill the anterior chamber.  Next, a keratome blade was used to create a triplanar temporal clear corneal incision and a cystotome and  Delfinoata forceps used to fashion a continuous curvilinear capsulorrhexis.  Hydrodissection was carried out using the Riggins hydrodissection cannula and the nucleus was found to be mobile.  Phacoemulsification of the nucleus was carried out using a quick chop technique, and all remaining epinuclear and cortical material was removed.  The eye was then reformed with Viscoelastic and the  intraocular lens was implanted into the capsular bag.  All remaining viscoelastics were removed from the eye and at the end of the case the pupil was round, the lens was well-centered within the capsular bag and all wounds were found to be water tight.  Drops of Vigamox and Pred Forte were instilled and a shield was placed over the eye. The patient will follow up with Dr. Rodriguez in the morning.

## 2024-12-20 ENCOUNTER — OFFICE VISIT (OUTPATIENT)
Dept: OPHTHALMOLOGY | Facility: CLINIC | Age: 64
End: 2024-12-20
Payer: COMMERCIAL

## 2024-12-20 DIAGNOSIS — Z98.890 POST-OPERATIVE STATE: Primary | ICD-10-CM

## 2024-12-20 DIAGNOSIS — H25.11 NUCLEAR SCLEROSIS OF RIGHT EYE: ICD-10-CM

## 2024-12-20 PROCEDURE — 99999 PR PBB SHADOW E&M-EST. PATIENT-LVL III: CPT | Mod: PBBFAC,,, | Performed by: OPHTHALMOLOGY

## 2024-12-20 NOTE — PROGRESS NOTES
HPI    12/19/2024 IMPLANT: [id number] 20.0 OD    Patient is here today for 1 day phaco OD. Vision looks a little blurry. No   pain or discomfort.     PMB TID OD  Last edited by Yessenia Pagan on 12/20/2024  8:56 AM.            Assessment /Plan     For exam results, see Encounter Report.    Post-operative state    Nuclear sclerosis of right eye      Slit lamp exam:  L/L: nl  K: clear, wound sealed  AC: 1+ cell  Lens: IOL centered and stable    POD1 s/p Phaco/IOL  Appropriate precautions and post op medications reviewed.  Patient instructed to call or come in if symptoms of redness, decreased vision, or pain are experienced.

## 2025-01-07 ENCOUNTER — OFFICE VISIT (OUTPATIENT)
Dept: OPHTHALMOLOGY | Facility: CLINIC | Age: 65
End: 2025-01-07
Payer: COMMERCIAL

## 2025-01-07 DIAGNOSIS — H25.13 NUCLEAR SCLEROSIS, BILATERAL: Primary | ICD-10-CM

## 2025-01-07 DIAGNOSIS — Z98.890 POST-OPERATIVE STATE: ICD-10-CM

## 2025-01-07 DIAGNOSIS — H35.341 LAMELLAR MACULAR HOLE OF RIGHT EYE: ICD-10-CM

## 2025-01-07 PROCEDURE — 99024 POSTOP FOLLOW-UP VISIT: CPT | Mod: S$GLB,,, | Performed by: OPHTHALMOLOGY

## 2025-01-07 PROCEDURE — 1160F RVW MEDS BY RX/DR IN RCRD: CPT | Mod: CPTII,S$GLB,, | Performed by: OPHTHALMOLOGY

## 2025-01-07 PROCEDURE — 99999 PR PBB SHADOW E&M-EST. PATIENT-LVL III: CPT | Mod: PBBFAC,,, | Performed by: OPHTHALMOLOGY

## 2025-01-07 PROCEDURE — 1159F MED LIST DOCD IN RCRD: CPT | Mod: CPTII,S$GLB,, | Performed by: OPHTHALMOLOGY

## 2025-01-07 NOTE — PROGRESS NOTES
HPI    12/19/2024 IMPLANT: [email or web address] 20.0 OD    PMB TID OD    Pt here for post op OD. Pt concerned about her VA OD. Pt denies eye pain   OD.   Last edited by Doris Rivera MA on 1/7/2025  3:24 PM.            Assessment /Plan     For exam results, see Encounter Report.    Nuclear sclerosis, bilateral    Lamellar macular hole of right eye    Post-operative state          Slit lamp exam:  L/L: nl  K: clear, wound sealed  AC: trace cell  Iris/Lens: IOL centered and stable    POW1 s/p phaco: Surgery healing well with no signs of infection or abnormal inflammation.    Patient wishes to wait with surgery in the second eye. Risks, benefits, alternatives reviewed. IOL selection reviewed.    Hx Mac Hole with PPV OD

## 2025-02-12 ENCOUNTER — OFFICE VISIT (OUTPATIENT)
Dept: OPTOMETRY | Facility: CLINIC | Age: 65
End: 2025-02-12
Payer: COMMERCIAL

## 2025-02-12 DIAGNOSIS — Z96.1 STATUS POST CATARACT EXTRACTION OF BOTH EYES WITH INSERTION OF INTRAOCULAR LENS: Primary | ICD-10-CM

## 2025-02-12 DIAGNOSIS — Z98.42 STATUS POST CATARACT EXTRACTION OF BOTH EYES WITH INSERTION OF INTRAOCULAR LENS: Primary | ICD-10-CM

## 2025-02-12 DIAGNOSIS — Z98.41 STATUS POST CATARACT EXTRACTION OF BOTH EYES WITH INSERTION OF INTRAOCULAR LENS: Primary | ICD-10-CM

## 2025-02-12 PROCEDURE — 99999 PR PBB SHADOW E&M-EST. PATIENT-LVL III: CPT | Mod: PBBFAC,,, | Performed by: OPTOMETRIST

## 2025-02-12 PROCEDURE — 1159F MED LIST DOCD IN RCRD: CPT | Mod: CPTII,S$GLB,, | Performed by: OPTOMETRIST

## 2025-02-12 PROCEDURE — 92015 DETERMINE REFRACTIVE STATE: CPT | Mod: S$GLB,,, | Performed by: OPTOMETRIST

## 2025-02-12 PROCEDURE — 99024 POSTOP FOLLOW-UP VISIT: CPT | Mod: S$GLB,,, | Performed by: OPTOMETRIST

## 2025-02-12 RX ORDER — PREDNISOLONE/MOXIFLOX/BROMF/PF 1 %-0.5 %
DROPS OPHTHALMIC (EYE)
COMMUNITY
Start: 2025-01-06

## 2025-02-12 NOTE — PROGRESS NOTES
HPI    Pt is here today for 1 month po and refraction. Denies pain/discomfort.   States that she is not ecstatic with her results.   DLS: 1/7/2025 Dr. Rodriguez  (-)Flashes   (-)Floaters   (-)Diplopia   (-)Headaches   (-)Itching   (-)Tearing  (-)Burning  (-)Dryness   (-) Photophobia  (-)Glare   (+)Blurred VA  Past Eye Sx: Cataract with IOL OD                       REMOVAL EPIRETINAL MEMBRANE OD  Eye Meds: (-)   Last edited by Yessenia Floyd, OD on 2/12/2025 12:18 PM.            Assessment /Plan     For exam results, see Encounter Report.    Status post cataract extraction of both eyes with insertion of intraocular lens      Educated on today's WNL findings OU. Eyes well healed from cataract surgery OU. Pt OK to use OTC readers.     Eyeglass Final Rx       Eyeglass Final Rx         Sphere Cylinder Add    Right +1.25  +2.00    Left +1.25 Sphere +2.00      Type: Bifocal or PAL    Expiration Date: 2/12/2026                   Continue care with Dr. King--reached out to his staff to get pt scheduled for overdue follow up  RTC in 1 year for annual eye exam unless changes noted sooner.

## 2025-03-11 ENCOUNTER — OFFICE VISIT (OUTPATIENT)
Dept: OPHTHALMOLOGY | Facility: CLINIC | Age: 65
End: 2025-03-11
Payer: COMMERCIAL

## 2025-03-11 ENCOUNTER — CLINICAL SUPPORT (OUTPATIENT)
Dept: OPHTHALMOLOGY | Facility: CLINIC | Age: 65
End: 2025-03-11
Payer: COMMERCIAL

## 2025-03-11 DIAGNOSIS — H35.341 FULL THICKNESS MACULAR HOLE, RIGHT: Primary | ICD-10-CM

## 2025-03-11 PROCEDURE — 92014 COMPRE OPH EXAM EST PT 1/>: CPT | Mod: S$GLB,,, | Performed by: OPHTHALMOLOGY

## 2025-03-11 PROCEDURE — 1160F RVW MEDS BY RX/DR IN RCRD: CPT | Mod: CPTII,S$GLB,, | Performed by: OPHTHALMOLOGY

## 2025-03-11 PROCEDURE — 99999 PR PBB SHADOW E&M-EST. PATIENT-LVL III: CPT | Mod: PBBFAC,,, | Performed by: OPHTHALMOLOGY

## 2025-03-11 PROCEDURE — 1159F MED LIST DOCD IN RCRD: CPT | Mod: CPTII,S$GLB,, | Performed by: OPHTHALMOLOGY

## 2025-03-11 PROCEDURE — 92201 OPSCPY EXTND RTA DRAW UNI/BI: CPT | Mod: S$GLB,,, | Performed by: OPHTHALMOLOGY

## 2025-03-11 PROCEDURE — 92134 CPTRZ OPH DX IMG PST SGM RTA: CPT | Mod: S$GLB,,, | Performed by: OPHTHALMOLOGY
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
## 2025-03-13 DIAGNOSIS — R71.8 ELEVATED MCV: ICD-10-CM

## 2025-03-13 DIAGNOSIS — F41.9 ANXIETY AND DEPRESSION: ICD-10-CM

## 2025-03-13 DIAGNOSIS — I25.10 CORONARY ARTERY DISEASE INVOLVING NATIVE CORONARY ARTERY OF NATIVE HEART WITHOUT ANGINA PECTORIS: Primary | ICD-10-CM

## 2025-03-13 DIAGNOSIS — I51.81 TAKOTSUBO CARDIOMYOPATHY: ICD-10-CM

## 2025-03-13 DIAGNOSIS — F32.A CHRONIC DEPRESSION: ICD-10-CM

## 2025-03-13 DIAGNOSIS — I25.10 CORONARY ARTERY DISEASE INVOLVING NATIVE CORONARY ARTERY OF NATIVE HEART WITHOUT ANGINA PECTORIS: ICD-10-CM

## 2025-03-13 DIAGNOSIS — F32.A ANXIETY AND DEPRESSION: ICD-10-CM

## 2025-03-13 DIAGNOSIS — R03.0 ELEVATED BLOOD PRESSURE READING WITHOUT DIAGNOSIS OF HYPERTENSION: ICD-10-CM

## 2025-03-13 NOTE — TELEPHONE ENCOUNTER
----- Message from Med Assistant Neela sent at 3/13/2025  1:34 PM CDT -----  Contact: [phone]  Prescription refill request.Megan (patient)RX name and strength (copy/paste from chart):   metoprolol succinate (TOPROL-XL) 25 MG 24 hr tablet,atorvastatin (LIPITOR) 80 MG tablet,losartan (COZAAR) 25 MG tablet,busPIRone (BUSPAR) 15 MG tablet,and buPROPion (WELLBUTRIN SR) 150 MG TBSR 12 hr tabletDirections (copy/paste from chart):  Take 1 tablet (25 mg total) by mouth once daily. - Oral,- Route: Take 1 tablet (80 mg total) by mouth once daily. - Oral, Take 1 tablet (25 mg total) by mouth once daily. - Oral,: Take 1 tablet (15 mg total) by mouth 3 (three) times daily. - Oral, and : Take 1 tablet (150 mg total) by mouth 2 (two) times daily. - OralIs this a 30 day or 90 day RX:  90 dayLocal pharmacy or mail order pharmacy:  localPharmacy name and phone # (copy/paste from chart):   [id number] - [place], [place] - [address] information:  n/a   Cheek Interpolation Flap Text: A decision was made to reconstruct the defect utilizing an interpolation axial flap and a staged reconstruction.  A telfa template was made of the defect.  This telfa template was then used to outline the Cheek Interpolation flap.  The donor area for the pedicle flap was then injected with anesthesia.  The flap was excised through the skin and subcutaneous tissue down to the layer of the underlying musculature.  The interpolation flap was carefully excised within this deep plane to maintain its blood supply.  The edges of the donor site were undermined.   The donor site was closed in a primary fashion.  The pedicle was then rotated into position and sutured.  Once the tube was sutured into place, adequate blood supply was confirmed with blanching and refill.  The pedicle was then wrapped with xeroform gauze and dressed appropriately with a telfa and gauze bandage to ensure continued blood supply and protect the attached pedicle.

## 2025-03-17 ENCOUNTER — TELEPHONE (OUTPATIENT)
Dept: INTERNAL MEDICINE | Facility: CLINIC | Age: 65
End: 2025-03-17
Payer: COMMERCIAL

## 2025-03-17 RX ORDER — BUPROPION HYDROCHLORIDE 150 MG/1
150 TABLET, EXTENDED RELEASE ORAL 2 TIMES DAILY
Qty: 60 TABLET | Refills: 5 | OUTPATIENT
Start: 2025-03-17

## 2025-03-17 RX ORDER — BUSPIRONE HYDROCHLORIDE 15 MG/1
15 TABLET ORAL 3 TIMES DAILY
Qty: 90 TABLET | Refills: 5 | OUTPATIENT
Start: 2025-03-17

## 2025-03-17 RX ORDER — METOPROLOL SUCCINATE 25 MG/1
25 TABLET, EXTENDED RELEASE ORAL DAILY
Qty: 90 TABLET | Refills: 3 | Status: SHIPPED | OUTPATIENT
Start: 2025-03-17 | End: 2026-03-17

## 2025-03-17 RX ORDER — LOSARTAN POTASSIUM 25 MG/1
25 TABLET ORAL DAILY
Qty: 90 TABLET | Refills: 3 | Status: SHIPPED | OUTPATIENT
Start: 2025-03-17 | End: 2026-03-12

## 2025-03-17 NOTE — TELEPHONE ENCOUNTER
----- Message from Brad Isanti sent at 3/17/2025 10:35 AM CDT -----  Regarding: Call for Appointment  Refilled her cardiac medications. She needs an appointment for her yearly. Her psych meds, Wellbutrin, and buspar haven't been filled in almost 6 months, so will need to see her before we resume. Please ask when last she took those medications. I ordered some blood work for her to complete prior to her visit.

## 2025-03-18 ENCOUNTER — OFFICE VISIT (OUTPATIENT)
Dept: INTERNAL MEDICINE | Facility: CLINIC | Age: 65
End: 2025-03-18
Payer: COMMERCIAL

## 2025-03-18 ENCOUNTER — LAB VISIT (OUTPATIENT)
Dept: LAB | Facility: HOSPITAL | Age: 65
End: 2025-03-18
Payer: COMMERCIAL

## 2025-03-18 VITALS
HEART RATE: 72 BPM | DIASTOLIC BLOOD PRESSURE: 76 MMHG | HEIGHT: 64 IN | SYSTOLIC BLOOD PRESSURE: 124 MMHG | BODY MASS INDEX: 21.38 KG/M2 | WEIGHT: 125.25 LBS

## 2025-03-18 DIAGNOSIS — R71.8 ELEVATED MCV: ICD-10-CM

## 2025-03-18 DIAGNOSIS — R03.0 ELEVATED BLOOD PRESSURE READING WITHOUT DIAGNOSIS OF HYPERTENSION: ICD-10-CM

## 2025-03-18 DIAGNOSIS — F32.A ANXIETY AND DEPRESSION: ICD-10-CM

## 2025-03-18 DIAGNOSIS — Z00.00 HEALTHCARE MAINTENANCE: ICD-10-CM

## 2025-03-18 DIAGNOSIS — F17.200 SMOKER: ICD-10-CM

## 2025-03-18 DIAGNOSIS — Z12.11 COLON CANCER SCREENING: ICD-10-CM

## 2025-03-18 DIAGNOSIS — F41.9 ANXIETY AND DEPRESSION: ICD-10-CM

## 2025-03-18 DIAGNOSIS — I25.10 CORONARY ARTERY DISEASE INVOLVING NATIVE CORONARY ARTERY OF NATIVE HEART WITHOUT ANGINA PECTORIS: ICD-10-CM

## 2025-03-18 DIAGNOSIS — Z00.00 ANNUAL PHYSICAL EXAM: ICD-10-CM

## 2025-03-18 DIAGNOSIS — F32.A CHRONIC DEPRESSION: ICD-10-CM

## 2025-03-18 DIAGNOSIS — G47.00 INSOMNIA, UNSPECIFIED TYPE: ICD-10-CM

## 2025-03-18 DIAGNOSIS — I10 HYPERTENSION, UNSPECIFIED TYPE: ICD-10-CM

## 2025-03-18 DIAGNOSIS — I51.81 TAKOTSUBO CARDIOMYOPATHY: Primary | ICD-10-CM

## 2025-03-18 LAB
ANION GAP SERPL CALC-SCNC: 8 MMOL/L (ref 8–16)
BUN SERPL-MCNC: 14 MG/DL (ref 8–23)
CALCIUM SERPL-MCNC: 9.5 MG/DL (ref 8.7–10.5)
CHLORIDE SERPL-SCNC: 108 MMOL/L (ref 95–110)
CHOLEST SERPL-MCNC: 154 MG/DL (ref 120–199)
CHOLEST/HDLC SERPL: 2.3 {RATIO} (ref 2–5)
CO2 SERPL-SCNC: 26 MMOL/L (ref 23–29)
CREAT SERPL-MCNC: 0.7 MG/DL (ref 0.5–1.4)
EST. GFR  (NO RACE VARIABLE): >60 ML/MIN/1.73 M^2
FOLATE SERPL-MCNC: 14.1 NG/ML (ref 4–24)
GLUCOSE SERPL-MCNC: 91 MG/DL (ref 70–110)
HDLC SERPL-MCNC: 66 MG/DL (ref 40–75)
HDLC SERPL: 42.9 % (ref 20–50)
LDLC SERPL CALC-MCNC: 70.6 MG/DL (ref 63–159)
NONHDLC SERPL-MCNC: 88 MG/DL
POTASSIUM SERPL-SCNC: 4.5 MMOL/L (ref 3.5–5.1)
SODIUM SERPL-SCNC: 142 MMOL/L (ref 136–145)
TRIGL SERPL-MCNC: 87 MG/DL (ref 30–150)
VIT B12 SERPL-MCNC: 298 PG/ML (ref 210–950)

## 2025-03-18 PROCEDURE — 99999 PR PBB SHADOW E&M-EST. PATIENT-LVL IV: CPT | Mod: PBBFAC,,,

## 2025-03-18 PROCEDURE — 85025 COMPLETE CBC W/AUTO DIFF WBC: CPT

## 2025-03-18 PROCEDURE — 82607 VITAMIN B-12: CPT

## 2025-03-18 PROCEDURE — 80061 LIPID PANEL: CPT

## 2025-03-18 PROCEDURE — 82746 ASSAY OF FOLIC ACID SERUM: CPT

## 2025-03-18 PROCEDURE — 80048 BASIC METABOLIC PNL TOTAL CA: CPT

## 2025-03-18 PROCEDURE — 36415 COLL VENOUS BLD VENIPUNCTURE: CPT

## 2025-03-18 RX ORDER — TRAZODONE HYDROCHLORIDE 50 MG/1
TABLET ORAL
Qty: 30 TABLET | Refills: 3 | Status: SHIPPED | OUTPATIENT
Start: 2025-03-18

## 2025-03-18 RX ORDER — ATORVASTATIN CALCIUM 80 MG/1
80 TABLET, FILM COATED ORAL DAILY
Qty: 90 TABLET | Refills: 3 | Status: SHIPPED | OUTPATIENT
Start: 2025-03-18 | End: 2026-03-13

## 2025-03-18 RX ORDER — BUSPIRONE HYDROCHLORIDE 15 MG/1
15 TABLET ORAL 2 TIMES DAILY
Qty: 90 TABLET | Refills: 5 | Status: SHIPPED | OUTPATIENT
Start: 2025-03-18

## 2025-03-18 RX ORDER — BUPROPION HYDROCHLORIDE 150 MG/1
150 TABLET ORAL DAILY
Qty: 90 TABLET | Refills: 3 | Status: SHIPPED | OUTPATIENT
Start: 2025-03-18 | End: 2026-03-18

## 2025-03-18 RX ORDER — BUPROPION HYDROCHLORIDE 150 MG/1
150 TABLET, EXTENDED RELEASE ORAL 2 TIMES DAILY
Qty: 60 TABLET | Refills: 5 | Status: CANCELLED | OUTPATIENT
Start: 2025-03-18

## 2025-03-18 NOTE — PROGRESS NOTES
Subjective     Chief Complaint: Annual     History of Present Illness:  Ms. Megan Mccall is a 64 y.o. female with hx of takatsubo cardiomyopathy here for annual exam. Overall doing well. 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 Is consistent with medications with exception to bASA which she had recently stopped as thought she did not need. Last cardiac note 12/2022 stating to continue. Saw psychiatry 5/24 and had trazodone added on nightly. She is currently out of this medication. Is still smoking but has decreased her intake. Is interested in recommended screenings and medication refills as appropriate. NO recent illness noted.      Review of Systems   Constitutional:  Negative for chills, diaphoresis, fever, malaise/fatigue and weight loss.   Respiratory:  Negative for shortness of breath and wheezing.    Cardiovascular:  Negative for chest pain, palpitations and leg swelling.   Gastrointestinal:  Negative for abdominal pain, heartburn, nausea and vomiting.   Psychiatric/Behavioral:  Positive for depression.      PAST HISTORY:     Past Medical History:   Diagnosis Date    Anxiety     Depression     Hyperplastic colon polyp     Takotsubo cardiomyopathy     Tobacco abuse        Past Surgical History:   Procedure Laterality Date    ANGIOGRAM, CORONARY, WITH LEFT HEART CATHETERIZATION N/A 12/5/2022    Procedure: Angiogram, Coronary, with Left Heart Cath;  Surgeon: Gerardo Reynolds Jr., MD;  Location: [facility] CATH LAB;  Service: Cardiology;  Laterality: N/A;    CATARACT EXTRACTION W/  INTRAOCULAR LENS IMPLANT Right 12/19/2024    Procedure: EXTRACTION, CATARACT, WITH IOL INSERTION;  Surgeon: Belen Rodriguez MD;  Location: [facility] OR;  Service: Ophthalmology;  Laterality: Right;    NONE      REMOVAL OF EPIRETINAL MEMBRANE Right 7/17/2024    Procedure: REMOVAL, EPIRETINAL MEMBRANE;  Surgeon: ADALI King MD;  Location: [address];  Service: Ophthalmology;  Laterality: Right;  Plan  25g PPV/ILM peel with flap/SF6 OD for FTMH with ERM OD     Local MAC  LOC 40 min       Family History   Problem Relation Name Age of Onset    Heart disease Father      Heart attack Father  59    Diabetes Paternal Grandmother      Amblyopia Neg Hx      Blindness Neg Hx      Cataracts Neg Hx      Glaucoma Neg Hx      Macular degeneration Neg Hx      Retinal detachment Neg Hx      Strabismus Neg Hx         Social History[1]    MEDICATIONS & ALLERGIES:     Current Outpatient Medications on File Prior to Visit   Medication Sig    aspirin 81 MG Chew Chew and swallow 1 tablet (81 mg total) by mouth once daily.    losartan (COZAAR) 25 MG tablet Take 1 tablet (25 mg total) by mouth once daily.    metoprolol succinate (TOPROL-XL) 25 MG 24 hr tablet Take 1 tablet (25 mg total) by mouth once daily.    pneumoc 20-guillaume conj-dip cr,PF, (PREVNAR-20, PF,) 0.5 mL Syrg injection Inject 0.5 mLs into the muscle.    prednisolon/gatiflox/bromfenac (PREDNISOL ACE-GATIFLOX-BROMFEN) 1-0.5-0.075 % DrpS Apply 1 drop to eye 3 (three) times daily. in operative eye for 1 month after surgery    prednisolone/moxiflox/bromf/PF (DMVSIMVGYBW-JIOYOXOC-FWWYB,PF,) 1-0.5-0.09 % Drop Use as directed STARTING 2 DAYS PRIOR TO SURGERY insert 1 drop in surgical eye three times daily AND CONTINUE FOR 30 DAYS AFTER    [DISCONTINUED] atorvastatin (LIPITOR) 80 MG tablet Take 1 tablet (80 mg total) by mouth once daily.    [DISCONTINUED] buPROPion (WELLBUTRIN SR) 150 MG TBSR 12 hr tablet Take 1 tablet (150 mg total) by mouth 2 (two) times daily.    [DISCONTINUED] busPIRone (BUSPAR) 15 MG tablet Take 1 tablet (15 mg total) by mouth 3 (three) times daily.    [DISCONTINUED] traZODone (DESYREL) 50 MG tablet Take 1/2 to 1 tablet (25-50 mg) nightly as needed for insomnia.     Current Facility-Administered Medications on File Prior to Visit   Medication    fentaNYL injection 25 mcg    midazolam injection 1 mg       Review of patient's allergies indicates:  No Known  "Allergies    OBJECTIVE:     Vital Signs:  Vitals:    03/18/25 1330   BP: 124/76   Pulse: 72   Weight: 56.8 kg (125 lb 3.5 oz)   Height: 5' 4" (1.626 m)       Body mass index is 21.49 kg/m².     Physical Exam  Constitutional:       General: She is not in acute distress.     Appearance: She is not ill-appearing, toxic-appearing or diaphoretic.   HENT:      Head: Normocephalic and atraumatic.      Right Ear: Tympanic membrane, ear canal and external ear normal. There is impacted cerumen.      Left Ear: Tympanic membrane, ear canal and external ear normal. There is impacted cerumen.      Nose: Nose normal. No congestion.      Mouth/Throat:      Mouth: Mucous membranes are dry.      Pharynx: Oropharynx is clear. No oropharyngeal exudate or posterior oropharyngeal erythema.   Eyes:      General: No scleral icterus.        Right eye: No discharge.         Left eye: No discharge.      Extraocular Movements: Extraocular movements intact.      Conjunctiva/sclera: Conjunctivae normal.      Pupils: Pupils are equal, round, and reactive to light.   Cardiovascular:      Rate and Rhythm: Normal rate and regular rhythm.      Pulses: Normal pulses.      Heart sounds: Normal heart sounds.   Pulmonary:      Effort: Pulmonary effort is normal. No respiratory distress.      Breath sounds: Normal breath sounds. No wheezing.   Abdominal:      General: There is no distension.      Tenderness: There is no abdominal tenderness.   Skin:     General: Skin is warm and dry.   Neurological:      General: No focal deficit present.      Mental Status: She is oriented to person, place, and time. Mental status is at baseline.   Psychiatric:         Mood and Affect: Mood normal.         Behavior: Behavior normal.     Health Maintenance Due   Topic Date Due    TETANUS VACCINE  Never done    High Dose Statin  Never done    LDCT Lung Screen  Never done    Colorectal Cancer Screening  09/02/2017    RSV Vaccine (Age 60+ and Pregnant patients) (1 - Risk " 60-74 years 1-dose series) Never done    Cervical Cancer Screening  03/28/2022    Influenza Vaccine (1) 09/01/2024    COVID-19 Vaccine (4 - 2024-25 season) 09/01/2024         ASSESSMENT & PLAN:   Ms. Megan Mccall is a 64 y.o. female here for annual exam.    Plan:  - New CBC, BMP, lipid panel, B9/B12 for monitoring. Last MCV borderline elevated   - LDCT scan given smoking hx >20 years  - Gyn referral for well woman exam/cervical cx screening   - Colonoscopy referral, as overdue   - Refilled Wellbutrin Buspar, and trazodone   - Provided number for follow-up with psychiatry (last seen 5/2024)   - Refilled statin   - On OTC ASA   - Cardiology referral last seen 12/2022   - Recommend debrox for ear wax removal       Takotsubo cardiomyopathy  -     atorvastatin (LIPITOR) 80 MG tablet; Take 1 tablet (80 mg total) by mouth once daily.  Dispense: 90 tablet; Refill: 3    Anxiety and depression  -     busPIRone (BUSPAR) 15 MG tablet; Take 1 tablet (15 mg total) by mouth 2 (two) times daily.  Dispense: 90 tablet; Refill: 5  -     buPROPion (WELLBUTRIN XL) 150 MG TB24 tablet; Take 1 tablet (150 mg total) by mouth once daily.  Dispense: 90 tablet; Refill: 3    Hypertension, unspecified type    Elevated MCV  -     VITAMIN B12; Future; Expected date: 03/18/2025  -     FOLATE; Future; Expected date: 03/18/2025    Chronic depression  -     busPIRone (BUSPAR) 15 MG tablet; Take 1 tablet (15 mg total) by mouth 2 (two) times daily.  Dispense: 90 tablet; Refill: 5  -     buPROPion (WELLBUTRIN XL) 150 MG TB24 tablet; Take 1 tablet (150 mg total) by mouth once daily.  Dispense: 90 tablet; Refill: 3    Coronary artery disease involving native coronary artery of native heart without angina pectoris  -     atorvastatin (LIPITOR) 80 MG tablet; Take 1 tablet (80 mg total) by mouth once daily.  Dispense: 90 tablet; Refill: 3  -     Ambulatory referral/consult to Cardiology; Future; Expected date: 03/25/2025    Insomnia, unspecified type  -      traZODone (DESYREL) 50 MG tablet; Take 1/2 to 1 tablet (25-50 mg) by mouth nightly as needed for insomnia.  Dispense: 30 tablet; Refill: 3    Smoker  -     CT Chest Lung Screening Low Dose; Future; Expected date: 03/18/2025    Colon cancer screening  -     Ambulatory referral/consult to Endo Procedure ; Future; Expected date: 03/19/2025    Annual physical exam  -     Ambulatory referral/consult to Gynecology; Future; Expected date: 03/25/2025    Healthcare maintenance  -     Ambulatory referral/consult to Gynecology; Future; Expected date: 03/25/2025        RTC in 1 year or sooner PRN     Discussed with Dr. Ann  - staff attestation to follow      Brad Tomlinson DO, MSB   Internal Medicine, PGY-3  [facility]  [address] [place] [id number]  [phone]         [1]   Social History  Tobacco Use    Smoking status: Every Day     Current packs/day: 1.00     Average packs/day: 0.9 packs/day for 32.6 years (29.1 ttl pk-yrs)     Types: Cigarettes     Start date: 8/25/1992    Smokeless tobacco: Never    Tobacco comments:     Currently enrolled in smoking cessation program. Rolls her own cigarettes   Substance Use Topics    Alcohol use: Yes     Alcohol/week: 18.0 standard drinks of alcohol     Types: 18 Cans of beer per week     Comment: daily 3-4 beers a day    Drug use: No

## 2025-03-19 LAB
BASOPHILS # BLD AUTO: 0.05 K/UL (ref 0–0.2)
BASOPHILS NFR BLD: 0.8 % (ref 0–1.9)
DIFFERENTIAL METHOD BLD: ABNORMAL
EOSINOPHIL # BLD AUTO: 0.3 K/UL (ref 0–0.5)
EOSINOPHIL NFR BLD: 5.2 % (ref 0–8)
ERYTHROCYTE [DISTWIDTH] IN BLOOD BY AUTOMATED COUNT: 13.2 % (ref 11.5–14.5)
HCT VFR BLD AUTO: 47 % (ref 37–48.5)
HGB BLD-MCNC: 14.3 G/DL (ref 12–16)
IMM GRANULOCYTES # BLD AUTO: 0.01 K/UL (ref 0–0.04)
IMM GRANULOCYTES NFR BLD AUTO: 0.2 % (ref 0–0.5)
LYMPHOCYTES # BLD AUTO: 2.2 K/UL (ref 1–4.8)
LYMPHOCYTES NFR BLD: 35.4 % (ref 18–48)
MCH RBC QN AUTO: 31.2 PG (ref 27–31)
MCHC RBC AUTO-ENTMCNC: 30.4 G/DL (ref 32–36)
MCV RBC AUTO: 103 FL (ref 82–98)
MONOCYTES # BLD AUTO: 0.5 K/UL (ref 0.3–1)
MONOCYTES NFR BLD: 7.1 % (ref 4–15)
NEUTROPHILS # BLD AUTO: 3.2 K/UL (ref 1.8–7.7)
NEUTROPHILS NFR BLD: 51.3 % (ref 38–73)
NRBC BLD-RTO: 0 /100 WBC
PLATELET # BLD AUTO: 291 K/UL (ref 150–450)
PMV BLD AUTO: 10.6 FL (ref 9.2–12.9)
RBC # BLD AUTO: 4.58 M/UL (ref 4–5.4)
WBC # BLD AUTO: 6.32 K/UL (ref 3.9–12.7)

## 2025-03-21 ENCOUNTER — RESULTS FOLLOW-UP (OUTPATIENT)
Dept: INTERNAL MEDICINE | Facility: CLINIC | Age: 65
End: 2025-03-21

## 2025-03-26 ENCOUNTER — CLINICAL SUPPORT (OUTPATIENT)
Dept: SMOKING CESSATION | Facility: CLINIC | Age: 65
End: 2025-03-26

## 2025-03-26 DIAGNOSIS — F17.200 NICOTINE DEPENDENCE: Primary | ICD-10-CM

## 2025-03-26 PROCEDURE — 99407 BEHAV CHNG SMOKING > 10 MIN: CPT | Mod: ,,, | Performed by: GENERAL PRACTICE

## 2025-03-26 PROCEDURE — 99999 PR PBB SHADOW E&M-EST. PATIENT-LVL I: CPT | Mod: PBBFAC,,,

## 2025-03-26 NOTE — PROGRESS NOTES
Spoke with patient today in regard to smoking cessation progress 3/6/12 month telephone follow up, she states that she is not tobacco free.  Patient states she has trimmed down to a half a pack of cigarettes daily.  Patient states she rolls her own cigarettes.  Informed patient of benefit period, future follow up, and contact information if any further help or support is needed.  Patient not ready to schedule her appointment at this time.  Will complete smart form for 3/6/12 month follow up on Quit attempt #1 and resolve episode.

## 2025-06-11 ENCOUNTER — OFFICE VISIT (OUTPATIENT)
Dept: OBSTETRICS AND GYNECOLOGY | Facility: CLINIC | Age: 65
End: 2025-06-11
Payer: COMMERCIAL

## 2025-06-11 VITALS
HEART RATE: 120 BPM | WEIGHT: 117.31 LBS | HEIGHT: 64 IN | BODY MASS INDEX: 20.03 KG/M2 | DIASTOLIC BLOOD PRESSURE: 92 MMHG | SYSTOLIC BLOOD PRESSURE: 155 MMHG

## 2025-06-11 DIAGNOSIS — Z00.00 ANNUAL PHYSICAL EXAM: ICD-10-CM

## 2025-06-11 DIAGNOSIS — Z01.419 WELL WOMAN EXAM WITH ROUTINE GYNECOLOGICAL EXAM: Primary | ICD-10-CM

## 2025-06-11 DIAGNOSIS — Z00.00 HEALTHCARE MAINTENANCE: ICD-10-CM

## 2025-06-11 DIAGNOSIS — Z72.0 TOBACCO USE: ICD-10-CM

## 2025-06-11 PROCEDURE — 99999 PR PBB SHADOW E&M-EST. PATIENT-LVL IV: CPT | Mod: PBBFAC,,, | Performed by: STUDENT IN AN ORGANIZED HEALTH CARE EDUCATION/TRAINING PROGRAM

## 2025-06-11 NOTE — PROGRESS NOTES
History & Physical  Gynecology      SUBJECTIVE:     Chief Complaint: Annual Exam       History of Present Illness:  Annual Exam-Postmenopausal  Patient presents for annual exam. She has no complaints today.   Menopausal since age 50.  Patient denies post-menopausal vaginal bleeding. Paragard IUD in place for 20+ years.    She is not sexually active. She is not taking hormone replacement therapy.  She participates in regular exercise: active at work and in garden.  She does smoke - planning to restart smoking cessation course.     GYN screening history: last pap: patient does not recall when last pap was Denies history of abnormal pap smears  Mammogram history: 2024 BIRADS 1  Colonoscopy history:   Dexa history: not yet done    FH:   Breast cancer: maternal great aunt  Colon cancer: denies  Ovarian cancer: denies    Review of patient's allergies indicates:  No Known Allergies    Past Medical History:   Diagnosis Date    Anxiety     Depression     Hyperplastic colon polyp     Takotsubo cardiomyopathy     Tobacco abuse      Past Surgical History:   Procedure Laterality Date    ANGIOGRAM, CORONARY, WITH LEFT HEART CATHETERIZATION N/A 2022    Procedure: Angiogram, Coronary, with Left Heart Cath;  Surgeon: Gerardo Reynolds Jr., MD;  Location: [facility] CATH LAB;  Service: Cardiology;  Laterality: N/A;    CATARACT EXTRACTION W/  INTRAOCULAR LENS IMPLANT Right 2024    Procedure: EXTRACTION, CATARACT, WITH IOL INSERTION;  Surgeon: Belen Rodriguez MD;  Location: [facility] OR;  Service: Ophthalmology;  Laterality: Right;    NONE      REMOVAL OF EPIRETINAL MEMBRANE Right 2024    Procedure: REMOVAL, EPIRETINAL MEMBRANE;  Surgeon: ADALI King MD;  Location: [address];  Service: Ophthalmology;  Laterality: Right;  Plan 25g PPV/ILM peel with flap/SF6 OD for FTMH with ERM OD     Local MAC  LOC 40 min     OB History          3    Para   0    Term                AB   3    Living              SAB        IAB   3    Ectopic        Multiple        Live Births               Obstetric Comments   Menarche age 15.  LMP age 50.  History of abnormal PAP smear: NO.  History of sexually transmitted disease:  NO               Family History   Problem Relation Name Age of Onset    Heart disease Father      Heart attack Father  59    Diabetes Paternal Grandmother      Amblyopia Neg Hx      Blindness Neg Hx      Cataracts Neg Hx      Glaucoma Neg Hx      Macular degeneration Neg Hx      Retinal detachment Neg Hx      Strabismus Neg Hx       Social History[1]    Current Outpatient Medications   Medication Sig    aspirin 81 MG Chew Chew and swallow 1 tablet (81 mg total) by mouth once daily.    atorvastatin (LIPITOR) 80 MG tablet Take 1 tablet (80 mg total) by mouth once daily.    buPROPion (WELLBUTRIN XL) 150 MG TB24 tablet Take 1 tablet (150 mg total) by mouth once daily.    busPIRone (BUSPAR) 15 MG tablet Take 1 tablet (15 mg total) by mouth 2 (two) times daily.    losartan (COZAAR) 25 MG tablet Take 1 tablet (25 mg total) by mouth once daily.    metoprolol succinate (TOPROL-XL) 25 MG 24 hr tablet Take 1 tablet (25 mg total) by mouth once daily.    traZODone (DESYREL) 50 MG tablet Take 1/2 to 1 tablet (25-50 mg) by mouth nightly as needed for insomnia.    pneumoc 20-guillaume conj-dip cr,PF, (PREVNAR-20, PF,) 0.5 mL Syrg injection Inject 0.5 mLs into the muscle.    prednisolon/gatiflox/bromfenac (PREDNISOL ACE-GATIFLOX-BROMFEN) 1-0.5-0.075 % DrpS Apply 1 drop to eye 3 (three) times daily. in operative eye for 1 month after surgery    prednisolone/moxiflox/bromf/PF (ZAMKIXVPKKK-STCXZOLD-JBHYO,PF,) 1-0.5-0.09 % Drop Use as directed STARTING 2 DAYS PRIOR TO SURGERY insert 1 drop in surgical eye three times daily AND CONTINUE FOR 30 DAYS AFTER     Current Facility-Administered Medications   Medication    fentaNYL injection 25 mcg    midazolam injection 1 mg       Review of Systems:  Review of Systems   Constitutional:  Negative  for chills and fever.   Respiratory:  Negative for shortness of breath.    Cardiovascular:  Negative for chest pain.   Gastrointestinal:  Negative for abdominal pain, nausea and vomiting.   Endocrine: Negative for hot flashes.   Genitourinary:  Negative for hot flashes, postmenopausal bleeding and vaginal dryness.   Integumentary:  Negative for breast mass, nipple discharge and breast skin changes.   Neurological:  Negative for headaches.   Hematological:  Does not bruise/bleed easily.   Psychiatric/Behavioral:  Negative for depression.    Breast: Negative for mass, mastodynia, nipple discharge and skin changes       OBJECTIVE:     Physical Exam:  Physical Exam  Constitutional:       Appearance: Normal appearance.   HENT:      Head: Normocephalic and atraumatic.   Eyes:      Conjunctiva/sclera: Conjunctivae normal.      Pupils: Pupils are equal, round, and reactive to light.   Cardiovascular:      Rate and Rhythm: Normal rate and regular rhythm.   Pulmonary:      Effort: Pulmonary effort is normal. No respiratory distress.   Chest:   Breasts:     Right: No inverted nipple, mass, nipple discharge, skin change or tenderness.      Left: No inverted nipple, mass, nipple discharge, skin change or tenderness.   Abdominal:      General: Abdomen is flat. There is no distension.      Palpations: Abdomen is soft.      Tenderness: There is no abdominal tenderness.   Genitourinary:     General: Normal vulva.      Vagina: No vaginal discharge, tenderness or bleeding.      Cervix: No cervical motion tenderness or friability.      Comments: IUD strings visualized and bottom of IUD noted at external cervical os. IUD removal attempted with ring forceps, however unsuccessful and patient reported pain with continued attempts  Musculoskeletal:         General: Normal range of motion.      Cervical back: Normal range of motion.   Neurological:      Mental Status: She is alert.   Psychiatric:         Mood and Affect: Mood normal.          Thought Content: Thought content normal.         ASSESSMENT:       ICD-10-CM ICD-9-CM    1. Well woman exam with routine gynecological exam  Z01.419 V72.31 DXA Bone Density Appendicular Skeleton      Ambulatory referral/consult to Endo Procedure       Liquid-Based Pap Smear, Screening      2. Annual physical exam  Z00.00 V70.0 Ambulatory referral/consult to Gynecology      3. Healthcare maintenance  Z00.00 V70.0 Ambulatory referral/consult to Gynecology      4. Tobacco use  Z72.0 305.1 DXA Bone Density Appendicular Skeleton             Plan:      Megan was seen today for annual exam.    Diagnoses and all orders for this visit:    Well woman exam with routine gynecological exam  -     DXA Bone Density Appendicular Skeleton; Future  -     Ambulatory referral/consult to Endo Procedure ; Future  -     Liquid-Based Pap Smear, Screening    Annual physical exam  -     Ambulatory referral/consult to Gynecology    Healthcare maintenance  -     Ambulatory referral/consult to Gynecology    Tobacco use  -     DXA Bone Density Appendicular Skeleton; Future        Orders Placed This Encounter   Procedures    DXA Bone Density Appendicular Skeleton    Ambulatory referral/consult to Endo Procedure        Well Woman:   - Pap smear: collected  - Mammogram: up to date  - Colonoscopy: ordered  - Dexa: ordered  - Immunizations: per PCP  - Labs: reviewed  - Exercise recommended  - Tobacco Cessation counseling provided    Retained IUD:  - Unsuccessful IUD removal in clinc  - RTC for pre-op appointment for hysteroscopic IUD removal      Luis Manuel Hawkins    Answers submitted by the patient for this visit:  Gynecologic Exam Questionnaire  (Submitted on 6/10/2025)  Chief Complaint: Gynecologic exam  genital itching: No  genital lesions: No  genital odor: No  genital rash: No  missed menses: No  pelvic pain: No  vaginal bleeding: No  vaginal discharge: No  Pregnant now?: No  abdominal pain: No  anorexia: No  back  pain: No  chills: No  constipation: No  diarrhea: No  discolored urine: No  dysuria: No  fever: No  flank pain: No  frequency: No  headaches: No  hematuria: No  nausea: No  painful intercourse: No  rash: No  urgency: No  vomiting: No  STD: No  abdominal surgery: No   section: No  Ectopic pregnancy: No  Endometriosis: No  herpes simplex: No  gynecological surgery: No  menorrhagia: No  metrorrhagia: No  miscarriage: No  ovarian cysts: No  perineal abscess: No  PID: No  terminated pregnancy: No  vaginosis: No         [1]   Social History  Tobacco Use    Smoking status: Every Day     Current packs/day: 1.00     Average packs/day: 0.9 packs/day for 32.8 years (29.4 ttl pk-yrs)     Types: Cigarettes     Start date: 1992    Smokeless tobacco: Never    Tobacco comments:     Currently enrolled in smoking cessation program. Rolls her own cigarettes   Substance Use Topics    Alcohol use: Yes     Alcohol/week: 18.0 standard drinks of alcohol     Types: 18 Cans of beer per week     Comment: daily 3-4 beers a day    Drug use: No

## 2025-06-12 ENCOUNTER — HOSPITAL ENCOUNTER (OUTPATIENT)
Dept: RADIOLOGY | Facility: OTHER | Age: 65
Discharge: HOME | End: 2025-06-12
Attending: STUDENT IN AN ORGANIZED HEALTH CARE EDUCATION/TRAINING PROGRAM
Payer: COMMERCIAL

## 2025-06-12 DIAGNOSIS — Z01.419 WELL WOMAN EXAM WITH ROUTINE GYNECOLOGICAL EXAM: ICD-10-CM

## 2025-06-12 DIAGNOSIS — Z72.0 TOBACCO USE: ICD-10-CM

## 2025-06-12 PROCEDURE — 77081 DXA BONE DENSITY APPENDICULR: CPT | Mod: 26,,, | Performed by: RADIOLOGY

## 2025-06-12 PROCEDURE — 77081 DXA BONE DENSITY APPENDICULR: CPT | Mod: TC

## 2025-06-13 ENCOUNTER — RESULTS FOLLOW-UP (OUTPATIENT)
Dept: OBSTETRICS AND GYNECOLOGY | Facility: CLINIC | Age: 65
End: 2025-06-13

## 2025-06-13 DIAGNOSIS — M81.0 AGE-RELATED OSTEOPOROSIS WITHOUT CURRENT PATHOLOGICAL FRACTURE: Primary | ICD-10-CM

## 2025-06-18 ENCOUNTER — TELEPHONE (OUTPATIENT)
Dept: ENDOSCOPY | Facility: HOSPITAL | Age: 65
End: 2025-06-18
Payer: COMMERCIAL

## 2025-06-18 DIAGNOSIS — Z12.11 SPECIAL SCREENING FOR MALIGNANT NEOPLASMS, COLON: Primary | ICD-10-CM

## 2025-06-18 RX ORDER — SODIUM, POTASSIUM,MAG SULFATES 17.5-3.13G
1 SOLUTION, RECONSTITUTED, ORAL ORAL DAILY
Qty: 1 KIT | Refills: 0 | Status: SHIPPED | OUTPATIENT
Start: 2025-06-18 | End: 2025-06-20

## 2025-06-18 NOTE — TELEPHONE ENCOUNTER
Patient is scheduled for a Colonoscopy on 8/4/25 with Dr. SANCHO Ramirez  Referral for procedure from Telephone call

## 2025-07-29 ENCOUNTER — OFFICE VISIT (OUTPATIENT)
Dept: URGENT CARE | Facility: CLINIC | Age: 65
End: 2025-07-29
Payer: COMMERCIAL

## 2025-07-29 VITALS
DIASTOLIC BLOOD PRESSURE: 67 MMHG | SYSTOLIC BLOOD PRESSURE: 131 MMHG | HEART RATE: 71 BPM | HEIGHT: 64 IN | BODY MASS INDEX: 19.96 KG/M2 | OXYGEN SATURATION: 96 % | RESPIRATION RATE: 18 BRPM | WEIGHT: 116.88 LBS | TEMPERATURE: 98 F

## 2025-07-29 DIAGNOSIS — H91.91 HEARING LOSS OF RIGHT EAR, UNSPECIFIED HEARING LOSS TYPE: Primary | ICD-10-CM

## 2025-07-29 DIAGNOSIS — H61.23 BILATERAL IMPACTED CERUMEN: ICD-10-CM

## 2025-07-29 PROCEDURE — 99213 OFFICE O/P EST LOW 20 MIN: CPT | Mod: 25,S$GLB,, | Performed by: NURSE PRACTITIONER

## 2025-07-29 NOTE — PROGRESS NOTES
"Subjective:      Patient ID: Megan Mccall is a 64 y.o. female.    Vitals:  height is 5' 4" (1.626 m) and weight is 53 kg (116 lb 13.5 oz). Her oral temperature is 98.2 °F (36.8 °C). Her blood pressure is 131/67 and her pulse is 71. Her respiration is 18 and oxygen saturation is 96%.     Chief Complaint: Cerumen Impaction    Megan Mccall is a 64 y.o. female who presents today with a chief complaint of right ear wax that began 9 days ago.  Denies any pain, fever, or drainage.  Patient took OTC ear drops w/ mild relief to symptoms.      Ear Fullness   There is pain in the right ear. This is a new problem. The current episode started 1 to 4 weeks ago. The problem occurs constantly. The problem has been gradually worsening. There has been no fever. The pain is at a severity of 0/10. The patient is experiencing no pain. Associated symptoms include hearing loss. Pertinent negatives include no abdominal pain, coughing, diarrhea, ear discharge, headaches, neck pain, rash, rhinorrhea, sore throat or vomiting. She has tried ear drops for the symptoms. The treatment provided mild relief.       Constitution: Negative for chills and fever.   HENT:  Positive for hearing loss. Negative for ear discharge and sore throat.    Neck: Negative for neck pain.   Respiratory:  Negative for cough.    Gastrointestinal:  Negative for abdominal pain, vomiting and diarrhea.   Skin:  Negative for rash.   Neurological:  Negative for headaches.      Objective:     Physical Exam   Constitutional: She is oriented to person, place, and time. She appears well-developed. She is cooperative.  Non-toxic appearance. She does not appear ill. No distress. awake  HENT:   Head: Normocephalic and atraumatic.   Ears:   Right Ear: External ear normal. impacted cerumen  Left Ear: Hearing and external ear normal. impacted cerumen  Nose: Nose normal. No mucosal edema, rhinorrhea or nasal deformity. No epistaxis. Right sinus exhibits no maxillary sinus tenderness " and no frontal sinus tenderness. Left sinus exhibits no maxillary sinus tenderness and no frontal sinus tenderness.   Mouth/Throat: Uvula is midline, oropharynx is clear and moist and mucous membranes are normal. No trismus in the jaw. Normal dentition. No uvula swelling. No oropharyngeal exudate, posterior oropharyngeal edema or posterior oropharyngeal erythema.   Eyes: Conjunctivae and lids are normal. No scleral icterus.   Neck: Trachea normal and phonation normal. Neck supple. No edema present. No erythema present. No neck rigidity present.   Cardiovascular: Normal rate, regular rhythm, normal heart sounds and normal pulses.   Pulmonary/Chest: Effort normal and breath sounds normal. No respiratory distress. She has no decreased breath sounds. She has no rhonchi.   Abdominal: Normal appearance and bowel sounds are normal. Soft. flat abdomen   Musculoskeletal: Normal range of motion.         General: No deformity. Normal range of motion.   Neurological: no focal deficit. She is alert and oriented to person, place, and time. She exhibits normal muscle tone. Coordination normal.   Skin: Skin is warm, dry, intact, not diaphoretic and not pale. Capillary refill takes less than 2 seconds.   Psychiatric: Her speech is normal and behavior is normal. Mood, judgment and thought content normal.   Nursing note and vitals reviewed.      Assessment:     1. Hearing loss of right ear, unspecified hearing loss type    2. Bilateral impacted cerumen      Patient reports she now can hear in the right ear due to impaction status improved post impaction  removal patient states she can hear well  ears feel good.  There is no acute infection visualize post ear exam after cerebrum wax removal.  Plan:       Hearing loss of right ear, unspecified hearing loss type  -     Ambulatory referral/consult to ENT    Bilateral impacted cerumen  -     Ear wax removal  -     Ambulatory referral/consult to ENT        Patient Instructions   Discharge  instructions for right ear impaction.    Push fluids maintain hydration  Discussed management at home with Debrox kit which is an over-the-counter kit for wax removal      1) See orders for this visit as documented in the electronic medical record.  2) Symptomatic therapy suggested: use acetaminophen/ibuprofen every 6-8 hours prn pain or fever, push fluids.   3) Call or return to clinic prn if these symptoms worsen or fail to improve as anticipated.    Discussed results/diagnosis/plan with patient in clinic.  We had shared decision making for patient's treatment. Patient verbalized understanding and in agreement with current treatment plan.     Patient was instructed to return for re-evaluation with urgent care or PCP for continued outpatient workup and management if symptoms do not improve/worsening symptoms. Strict ED versus clinic precautions given in depth.    Discharge and follow-up instructions given verbally/printed with the patient who expressed understanding. The instructions and results are also available on [employer] [facility] [employer].      - You must understand that you have received an Urgent Care treatment only and that you may be released before all of your medical problems are known or treated.   - You, the patient, will arrange for follow up care as instructed.   - Follow up with your PCP or specialty clinic as directed in the next 1-2 weeks if not improved or as needed.  You can call [phone] to schedule an appointment with the appropriate provider.   - If your condition worsens or fails to improve we recommend that you receive another evaluation at the ER immediately or contact your PCP to discuss your concerns or return here.        CHAD Mcrae

## 2025-07-29 NOTE — PATIENT INSTRUCTIONS
Discharge instructions for right ear impaction.    Push fluids maintain hydration  Discussed management at home with Debrox kit which is an over-the-counter kit for wax removal      1) See orders for this visit as documented in the electronic medical record.  2) Symptomatic therapy suggested: use acetaminophen/ibuprofen every 6-8 hours prn pain or fever, push fluids.   3) Call or return to clinic prn if these symptoms worsen or fail to improve as anticipated.    Discussed results/diagnosis/plan with patient in clinic.  We had shared decision making for patient's treatment. Patient verbalized understanding and in agreement with current treatment plan.     Patient was instructed to return for re-evaluation with urgent care or PCP for continued outpatient workup and management if symptoms do not improve/worsening symptoms. Strict ED versus clinic precautions given in depth.    Discharge and follow-up instructions given verbally/printed with the patient who expressed understanding. The instructions and results are also available on [employer].      - You must understand that you have received an Urgent Care treatment only and that you may be released before all of your medical problems are known or treated.   - You, the patient, will arrange for follow up care as instructed.   - Follow up with your PCP or specialty clinic as directed in the next 1-2 weeks if not improved or as needed.  You can call [phone] to schedule an appointment with the appropriate provider.   - If your condition worsens or fails to improve we recommend that you receive another evaluation at the ER immediately or contact your PCP to discuss your concerns or return here.        CHAD Mcrae

## 2025-07-30 NOTE — PROCEDURES
Ear Cerumen Removal    Date/Time: 7/29/2025 1:30 PM    Performed by: Nafisa Ervin RT  Authorized by: Teri Gillis DNP    Location details:  Both ears  Procedure type: irrigation    Cerumen  Removal Results:  Cerumen completely removed  Patient tolerance:  Patient tolerated the procedure well with no immediate complications

## 2025-07-31 ENCOUNTER — TELEPHONE (OUTPATIENT)
Dept: ENDOSCOPY | Facility: HOSPITAL | Age: 65
End: 2025-07-31
Payer: COMMERCIAL

## 2025-07-31 NOTE — TELEPHONE ENCOUNTER
Received below message.   Spoke pt and all questions answered regarding upcoming colonoscopy on 8/4/25.

## 2025-07-31 NOTE — TELEPHONE ENCOUNTER
Ridge Mccarthy, RN  P [facility] Endoscopy Schedulers         Previous Messages       ----- Message -----  From: Pranav Piper  Sent: 7/31/2025   3:51 PM CDT  To: Tati Ramirez MD; [facility] Staff  Subject: Patient Inquiry                                  Patient called trying to get in touch with provider who could answer her questions about prepping for upcoming colonoscopy. If anyone is available to answer her questions please contact patient via cell phone    Megan Mccall (Legal Name)    64 y.o., 1960  Legal sex: Female  Marital status: Single  Languages MRN: [id number]    Contact Information    [phone] (Mobile)          Future Admission Information  Expected Admission Date & Time: 8/4/2025 11:20 AM  Pending Admission Date & Time: PCP: Brad Tomlinson DO  Admission Date & Time: Admitting Provider: Tati Ramirez MD    Unit: [facility] ENDOSCOPY [address] [place] [address] Room & Bed:  Patient Class: OP- Outpatient Procedures Hospital Service: Surgery  Primary Admission Diagnosis: Special screening for malignant neoplasms, colon [Z12.11]

## 2025-08-04 ENCOUNTER — HOSPITAL ENCOUNTER (OUTPATIENT)
Facility: HOSPITAL | Age: 65
Discharge: HOME | End: 2025-08-04
Attending: COLON & RECTAL SURGERY | Admitting: COLON & RECTAL SURGERY
Payer: COMMERCIAL

## 2025-08-04 ENCOUNTER — ANESTHESIA (OUTPATIENT)
Dept: ENDOSCOPY | Facility: HOSPITAL | Age: 65
End: 2025-08-04
Payer: COMMERCIAL

## 2025-08-04 ENCOUNTER — ANESTHESIA EVENT (OUTPATIENT)
Dept: ENDOSCOPY | Facility: HOSPITAL | Age: 65
End: 2025-08-04
Payer: COMMERCIAL

## 2025-08-04 VITALS
WEIGHT: 116.38 LBS | RESPIRATION RATE: 16 BRPM | DIASTOLIC BLOOD PRESSURE: 66 MMHG | HEIGHT: 64 IN | OXYGEN SATURATION: 98 % | TEMPERATURE: 98 F | SYSTOLIC BLOOD PRESSURE: 136 MMHG | HEART RATE: 64 BPM | BODY MASS INDEX: 19.87 KG/M2

## 2025-08-04 DIAGNOSIS — Z12.11 SPECIAL SCREENING FOR MALIGNANT NEOPLASMS, COLON: ICD-10-CM

## 2025-08-04 DIAGNOSIS — Z12.11 SCREENING FOR COLON CANCER: ICD-10-CM

## 2025-08-04 PROCEDURE — 37000009 HC ANESTHESIA EA ADD 15 MINS: Performed by: COLON & RECTAL SURGERY

## 2025-08-04 PROCEDURE — 27201028 HC NEEDLE, SCLERO: Performed by: COLON & RECTAL SURGERY

## 2025-08-04 PROCEDURE — 45381 COLONOSCOPY SUBMUCOUS NJX: CPT | Mod: 33 | Performed by: COLON & RECTAL SURGERY

## 2025-08-04 PROCEDURE — 45385 COLONOSCOPY W/LESION REMOVAL: CPT | Mod: 33 | Performed by: COLON & RECTAL SURGERY

## 2025-08-04 PROCEDURE — 63600175 PHARM REV CODE 636 W HCPCS

## 2025-08-04 PROCEDURE — 27201089 HC SNARE, DISP (ANY): Performed by: COLON & RECTAL SURGERY

## 2025-08-04 PROCEDURE — 88305 TISSUE EXAM BY PATHOLOGIST: CPT | Mod: TC,91 | Performed by: COLON & RECTAL SURGERY

## 2025-08-04 PROCEDURE — 45381 COLONOSCOPY SUBMUCOUS NJX: CPT | Mod: 33,51,, | Performed by: COLON & RECTAL SURGERY

## 2025-08-04 PROCEDURE — 27200997: Performed by: COLON & RECTAL SURGERY

## 2025-08-04 PROCEDURE — 37000008 HC ANESTHESIA 1ST 15 MINUTES: Performed by: COLON & RECTAL SURGERY

## 2025-08-04 PROCEDURE — 88305 TISSUE EXAM BY PATHOLOGIST: CPT | Mod: 26,,, | Performed by: STUDENT IN AN ORGANIZED HEALTH CARE EDUCATION/TRAINING PROGRAM

## 2025-08-04 PROCEDURE — 45385 COLONOSCOPY W/LESION REMOVAL: CPT | Mod: 33,,, | Performed by: COLON & RECTAL SURGERY

## 2025-08-04 PROCEDURE — 25000003 PHARM REV CODE 250

## 2025-08-04 RX ORDER — SODIUM CHLORIDE 9 MG/ML
INJECTION, SOLUTION INTRAVENOUS CONTINUOUS
Status: DISCONTINUED | OUTPATIENT
Start: 2025-08-04 | End: 2025-08-04 | Stop reason: HOSPADM

## 2025-08-04 RX ORDER — PROPOFOL 10 MG/ML
VIAL (ML) INTRAVENOUS
Status: DISCONTINUED | OUTPATIENT
Start: 2025-08-04 | End: 2025-08-04

## 2025-08-04 RX ORDER — LIDOCAINE HYDROCHLORIDE 20 MG/ML
INJECTION INTRAVENOUS
Status: DISCONTINUED | OUTPATIENT
Start: 2025-08-04 | End: 2025-08-04

## 2025-08-04 RX ORDER — PROPOFOL 10 MG/ML
VIAL (ML) INTRAVENOUS CONTINUOUS PRN
Status: DISCONTINUED | OUTPATIENT
Start: 2025-08-04 | End: 2025-08-04

## 2025-08-04 RX ADMIN — LIDOCAINE HYDROCHLORIDE 50 MG: 20 INJECTION INTRAVENOUS at 07:08

## 2025-08-04 RX ADMIN — PROPOFOL 80 MG: 10 INJECTION, EMULSION INTRAVENOUS at 07:08

## 2025-08-04 RX ADMIN — PROPOFOL 175 MCG/KG/MIN: 10 INJECTION, EMULSION INTRAVENOUS at 07:08

## 2025-08-04 RX ADMIN — SODIUM CHLORIDE: 9 INJECTION, SOLUTION INTRAVENOUS at 07:08

## 2025-08-04 NOTE — ANESTHESIA PREPROCEDURE EVALUATION
08/04/2025  Megan Mccall is a 64 y.o., female.    Review of patient's allergies indicates:  No Known Allergies    Past Medical History:   Diagnosis Date    Anxiety     Depression     Hyperplastic colon polyp     Takotsubo cardiomyopathy     Tobacco abuse      Past Surgical History:   Procedure Laterality Date    ANGIOGRAM, CORONARY, WITH LEFT HEART CATHETERIZATION N/A 12/5/2022    Procedure: Angiogram, Coronary, with Left Heart Cath;  Surgeon: Gerardo Reynolds Jr., MD;  Location: [facility] CATH LAB;  Service: Cardiology;  Laterality: N/A;    CATARACT EXTRACTION W/  INTRAOCULAR LENS IMPLANT Right 12/19/2024    Procedure: EXTRACTION, CATARACT, WITH IOL INSERTION;  Surgeon: Belen Rodriguez MD;  Location: [facility] OR;  Service: Ophthalmology;  Laterality: Right;    NONE      REMOVAL OF EPIRETINAL MEMBRANE Right 7/17/2024    Procedure: REMOVAL, EPIRETINAL MEMBRANE;  Surgeon: ADALI King MD;  Location: [facility] OR [address] [place] [address];  Service: Ophthalmology;  Laterality: Right;  Plan 25g PPV/ILM peel with flap/SF6 OD for FTMH with ERM OD     Local MAC  LOC 40 min       Pre-op Assessment    I have reviewed the Patient Summary Reports.    I have reviewed the NPO Status.   I have reviewed the Medications.     Review of Systems  Anesthesia Hx:  No problems with previous Anesthesia                Social:  Smoker       Hematology/Oncology:  Hematology Normal   Oncology Normal                                   EENT/Dental:  EENT/Dental Normal           Cardiovascular:  Cardiovascular Normal       CAD              ECG has been reviewed.      Coronary Artery Disease:                                  Pulmonary:  Pulmonary Normal                       Renal/:  Renal/ Normal                 Hepatic/GI:  Hepatic/GI Normal                    Musculoskeletal:  Musculoskeletal Normal                Neurological:  Neurology Normal                                       Endocrine:  Endocrine Normal            Dermatological:  Skin Normal    Psych:  Psychiatric History                  Physical Exam  General: Well nourished, Cooperative, Alert and Oriented    Airway:  Mallampati: II   Mouth Opening: Normal  TM Distance: Normal  Tongue: Normal  Neck ROM: Normal ROM    Dental:  Intact, Dentures    Chest/Lungs:  Normal Respiratory Rate        Anesthesia Plan  Type of Anesthesia, risks & benefits discussed:    Anesthesia Type: Gen Natural Airway  Intra-op Monitoring Plan: Standard ASA Monitors  Post Op Pain Control Plan: multimodal analgesia  Induction:  IV  Informed Consent: Informed consent signed with the Patient and all parties understand the risks and agree with anesthesia plan.  All questions answered.   ASA Score: 2  Day of Surgery Review of History & Physical: H&P Update referred to the surgeon/provider.    Ready For Surgery From Anesthesia Perspective.     .

## 2025-08-04 NOTE — ANESTHESIA POSTPROCEDURE EVALUATION
Anesthesia Post Evaluation    Patient: Megan Mccall    Procedure(s) Performed: Procedure(s) (LRB):  COLONOSCOPY, SCREENING, LOW RISK PATIENT (N/A)    Final Anesthesia Type: general      Patient location during evaluation: GI PACU  Patient participation: Yes- Able to Participate  Level of consciousness: awake and alert  Post-procedure vital signs: reviewed and stable  Pain management: adequate  Airway patency: patent    PONV status at discharge: No PONV  Anesthetic complications: no      Cardiovascular status: blood pressure returned to baseline and hemodynamically stable  Respiratory status: spontaneous ventilation  Hydration status: euvolemic  Follow-up not needed.              Vitals Value Taken Time   /66 08/04/25 09:15   Temp 36.5 °C (97.7 °F) 08/04/25 08:45   Pulse 64 08/04/25 09:15   Resp 16 08/04/25 09:15   SpO2 98 % 08/04/25 09:15         Event Time   Out of Recovery 09:24:56         Pain/Prisca Score: Prisca Score: 10 (8/4/2025  9:00 AM)

## 2025-08-05 ENCOUNTER — TELEPHONE (OUTPATIENT)
Dept: SURGERY | Facility: CLINIC | Age: 65
End: 2025-08-05
Payer: COMMERCIAL

## 2025-08-05 LAB
ESTROGEN SERPL-MCNC: NORMAL PG/ML
INSULIN SERPL-ACNC: NORMAL U[IU]/ML
LAB AP CLINICAL INFORMATION: NORMAL
LAB AP GROSS DESCRIPTION: NORMAL
LAB AP PERFORMING LOCATION(S): NORMAL
LAB AP REPORT FOOTNOTES: NORMAL
T3RU NFR SERPL: NORMAL %

## 2025-08-05 NOTE — TELEPHONE ENCOUNTER
Copied from CRM #[id number]. Topic: General Inquiry - Patient Advice  >> Aug 5, 2025  1:02 PM Lizzy wrote:  Type:  Needs Medical Advice    Who Called: Megan Mccall [[id number]]    Would the patient rather a call back or a response via [facility]? Call back     Best Call Back Number: [phone]    Additional Information: Patient states she believes she is having a reaction to the oxygen from the procedure. Patient states her nose is running and is constantly sneezing since 9 am yesterday. Patient would like a call back with further assistance.    Returned call regarding phone msg left above w/ phone staff.    Pt states she was calling to provide feedback as she's never experienced any type of post-procedural reaction such as the one described in the phone message.  Further info provided per pt: Sneezed 3 or 4 times yesterday AM upon awakening, nose has been running like a faucet, & continued sneezing ever since. Pt mentioned she went online to research s/s which led her to believe that the supplemental O2 provided during procedure was the cause. Reviewed dryness from supplemental O2 (w/o humidification) & the nasal cannula itself can be irritating to the nares which can be contributing to the symptoms she's experiencing. Ms. Mccall requested advising on ways to get symptoms to subside. RN recommended taking an OTC anti-histamine such as Claritin or Zyrtec to alleviate symptoms described above. Pt verbalized understanding.   Ms. Mccall requested post-procedural reaction to be noted in chart. RN ensured pt that information would be documented in the phone encounter note.     All questions & concerns addressed to the patient's satisfaction.    No additional needs identified at this time.

## (undated) DEVICE — DRAPE ANGIO BRACH 38X44IN

## (undated) DEVICE — DRESSING EYE OVAL LF

## (undated) DEVICE — HOLDER TUBE

## (undated) DEVICE — PACK TOTAL PLUS 25G VITRECTOMY

## (undated) DEVICE — NDL 22GA X1 1/2 REG BEVEL

## (undated) DEVICE — COVER MAYO STAND REINFRCD 30

## (undated) DEVICE — DRAPE THREE-QTR REINF 53X77IN

## (undated) DEVICE — KIT PROBE COVER WITH GEL

## (undated) DEVICE — NDL HYPO STD REG BVL 30G 0.5IN

## (undated) DEVICE — NEEDLE HYPODERMIC HUB LUER LOC

## (undated) DEVICE — SOL BALANCED SALT 500ML

## (undated) DEVICE — SOL GONAK

## (undated) DEVICE — TRAY CATH LAB OMC

## (undated) DEVICE — PACK INSTRUMENT COVER DISPO

## (undated) DEVICE — GOWN SURGICAL X-LARGE

## (undated) DEVICE — KIT CUSTOM MANIFOLD

## (undated) DEVICE — HEMOSTAT VASC BAND REG 24CM

## (undated) DEVICE — NDL HYPO A BEVEL 30X1/2

## (undated) DEVICE — CORD FOR BIPOLAR FORCEPS 12

## (undated) DEVICE — KNIFE OPHTH MICRO UNITOME 5MM

## (undated) DEVICE — CONTAINER SPECIMEN OR STER 4OZ

## (undated) DEVICE — SOL POVIDONE SCRUB IODINE 4 OZ

## (undated) DEVICE — TRAY MUSCLE LID EYE

## (undated) DEVICE — Device

## (undated) DEVICE — SOL BETADINE 5%

## (undated) DEVICE — CATH EMPULSE ANGLED 5FR PIGTAI

## (undated) DEVICE — SYRINGE 30CC LL W/O NDL

## (undated) DEVICE — OMNIPAQUE 350 200ML

## (undated) DEVICE — PROBE ILLUM FLEX CURVE LASER

## (undated) DEVICE — SPIKE CONTRAST CONTROLLER

## (undated) DEVICE — COVER PROXIMA MAYO STAND

## (undated) DEVICE — SYR 1CC TB SG 27GX1/2

## (undated) DEVICE — SYR 10CC LUER LOCK

## (undated) DEVICE — CLOSURE SKIN STERI STRIP 1/2X4

## (undated) DEVICE — PAD DEFIB CADENCE ADULT R2

## (undated) DEVICE — SYR DISP LL 5CC

## (undated) DEVICE — GLOVE BIOGEL ECLIPSE SZ 6.5

## (undated) DEVICE — CATH RADIAL TIG 6FR 4.0 110CM

## (undated) DEVICE — KIT GREY EYE

## (undated) DEVICE — LENS VITRCTMY OPHTH 30DEG 59DE

## (undated) DEVICE — FORCEP GRASPING 25GA SMOOTH

## (undated) DEVICE — SOL WATER STRL IRR 1000ML

## (undated) DEVICE — KIT GLIDESHEATH SLEND 6FR 10CM

## (undated) DEVICE — FORCEP GRIESHABER MAXGRIP 25G

## (undated) DEVICE — SUT 7/0 18IN COATED VICRYL

## (undated) DEVICE — KIT PERFLUOROCARBON LIQUID

## (undated) DEVICE — GLOVE SENSICARE PI SURG 7.5

## (undated) DEVICE — BACKFLUSH 25GA SOFT-TIP DISP

## (undated) DEVICE — SOL BSS BALANCED SALT

## (undated) DEVICE — WIRE GUIDE SAFE-T-J .035 260CM

## (undated) DEVICE — TRANSDUCER ADULT DISP